# Patient Record
Sex: FEMALE | Race: ASIAN | NOT HISPANIC OR LATINO | URBAN - METROPOLITAN AREA
[De-identification: names, ages, dates, MRNs, and addresses within clinical notes are randomized per-mention and may not be internally consistent; named-entity substitution may affect disease eponyms.]

---

## 2023-09-14 ENCOUNTER — OUTPATIENT (OUTPATIENT)
Dept: OUTPATIENT SERVICES | Facility: HOSPITAL | Age: 33
LOS: 1 days | Discharge: HOME | End: 2023-09-14
Payer: COMMERCIAL

## 2023-09-14 ENCOUNTER — APPOINTMENT (OUTPATIENT)
Dept: ANTEPARTUM | Facility: CLINIC | Age: 33
End: 2023-09-14

## 2023-09-14 ENCOUNTER — EMERGENCY (EMERGENCY)
Facility: HOSPITAL | Age: 33
LOS: 1 days | Discharge: STILL ADMITTED OR ADMINISTRATIVE | End: 2023-09-14
Admitting: EMERGENCY MEDICINE
Payer: SELF-PAY

## 2023-09-14 VITALS
SYSTOLIC BLOOD PRESSURE: 103 MMHG | DIASTOLIC BLOOD PRESSURE: 59 MMHG | RESPIRATION RATE: 16 BRPM | HEART RATE: 85 BPM | OXYGEN SATURATION: 100 %

## 2023-09-14 VITALS
SYSTOLIC BLOOD PRESSURE: 99 MMHG | DIASTOLIC BLOOD PRESSURE: 55 MMHG | TEMPERATURE: 98 F | HEART RATE: 90 BPM | RESPIRATION RATE: 16 BRPM

## 2023-09-14 VITALS — SYSTOLIC BLOOD PRESSURE: 97 MMHG | HEART RATE: 73 BPM | DIASTOLIC BLOOD PRESSURE: 53 MMHG

## 2023-09-14 DIAGNOSIS — O26.899 OTHER SPECIFIED PREGNANCY RELATED CONDITIONS, UNSPECIFIED TRIMESTER: ICD-10-CM

## 2023-09-14 DIAGNOSIS — Z98.891 HISTORY OF UTERINE SCAR FROM PREVIOUS SURGERY: Chronic | ICD-10-CM

## 2023-09-14 LAB
APPEARANCE UR: ABNORMAL
BACTERIA # UR AUTO: NEGATIVE /HPF — SIGNIFICANT CHANGE UP
BILIRUB UR-MCNC: NEGATIVE — SIGNIFICANT CHANGE UP
CAST: 0 /LPF — SIGNIFICANT CHANGE UP (ref 0–4)
COLOR SPEC: YELLOW — SIGNIFICANT CHANGE UP
DIFF PNL FLD: NEGATIVE — SIGNIFICANT CHANGE UP
GLUCOSE UR QL: NEGATIVE MG/DL — SIGNIFICANT CHANGE UP
KETONES UR-MCNC: NEGATIVE MG/DL — SIGNIFICANT CHANGE UP
LEUKOCYTE ESTERASE UR-ACNC: ABNORMAL
NITRITE UR-MCNC: NEGATIVE — SIGNIFICANT CHANGE UP
PH UR: 8 — SIGNIFICANT CHANGE UP (ref 5–8)
PROT UR-MCNC: NEGATIVE MG/DL — SIGNIFICANT CHANGE UP
RBC CASTS # UR COMP ASSIST: 0 /HPF — SIGNIFICANT CHANGE UP (ref 0–4)
SP GR SPEC: 1.01 — SIGNIFICANT CHANGE UP (ref 1–1.03)
SQUAMOUS # UR AUTO: 2 /HPF — SIGNIFICANT CHANGE UP (ref 0–5)
UROBILINOGEN FLD QL: 0.2 MG/DL — SIGNIFICANT CHANGE UP (ref 0.2–1)
WBC UR QL: 1 /HPF — SIGNIFICANT CHANGE UP (ref 0–5)

## 2023-09-14 PROCEDURE — L9996: CPT

## 2023-09-14 PROCEDURE — 99221 1ST HOSP IP/OBS SF/LOW 40: CPT | Mod: 25

## 2023-09-14 PROCEDURE — 59025 FETAL NON-STRESS TEST: CPT | Mod: 26

## 2023-09-14 NOTE — OB RN TRIAGE NOTE - LANGUAGE ASSISTANCE NEEDED
pt speaksmruby andspainsh/Yes-Patient/Caregiver accepts free interpretation services... pt speaks mandarin and Arabic/Yes-Patient/Caregiver accepts free interpretation services...

## 2023-09-14 NOTE — OB PROVIDER TRIAGE NOTE - NS_OBGYNHISTORY_OBGYN_ALL_OB_FT
2011  6lbs 10 oz - in [place]  2014   6lbs 10 oz - in [place]  10/2/2017 primary cs 2/2 oligohydramnios? in [place] - 6lbs 10 oz

## 2023-09-14 NOTE — OB PROVIDER TRIAGE NOTE - NSHPPHYSICALEXAM_GEN_ALL_CORE
Vital Signs Last 24 Hrs  T(C): 36.7 (09-14-23 @ 15:41), Max: 36.7 (09-14-23 @ 15:16)  T(F): 98.06 (09-14-23 @ 15:41), Max: 98.1 (09-14-23 @ 15:16)  HR: 69 (09-14-23 @ 16:46) (69 - 90)  BP: 93/54 (09-14-23 @ 16:46) (91/53 - 107/56)  BP(mean): --  RR: 16 (09-14-23 @ 15:16) (16 - 16)  SpO2: 100% (09-14-23 @ 14:30) (100% - 100%)    abdomen soft, nontender  taus: sliup, iron breech presentation, anterior placenta, bpp 8/8, jaswant 14, m mode  bpm, ultrasound images saved in [facility]  nst reactive  toco: irregular ctx noted

## 2023-09-14 NOTE — ED ADULT TRIAGE NOTE - RELATIONSHIP TO PATIENT
Brother in law Localized Dermabrasion With Wire Brush Text: The patient was draped in routine manner.  Localized dermabrasion using 3 x 17 mm wire brush was performed in routine manner to papillary dermis. This spot dermabrasion is being performed to complete skin cancer reconstruction. It also will eliminate the other sun damaged precancerous cells that are known to be part of the regional effect of a lifetime's worth of sun exposure. This localized dermabrasion is therapeutic and should not be considered cosmetic in any regard. Localized Dermabrasion Text: The patient was draped in routine manner.  Localized dermabrasion using 3 x 17 mm wire brush was performed in routine manner to papillary dermis. This spot dermabrasion is being performed to complete skin cancer reconstruction. It also will eliminate the other sun damaged precancerous cells that are known to be part of the regional effect of a lifetime's worth of sun exposure. This localized dermabrasion is therapeutic and should not be considered cosmetic in any regard.

## 2023-09-14 NOTE — OB PROVIDER TRIAGE NOTE - HISTORY OF PRESENT ILLNESS
H+P obtained via  # [id number]    32 y.o.  RODOLFO 23 @ 30.2 weeks (pt provided 12.5 week ultrasound report) presents with c/o decreased FM since 23. Pt states + movement, "just decreased." Pt denies LOF, VB, ctx, fever, chills. Pt states she is newly arrived from [place], will remain in [place] with her sister until delivery and desires to establish prenatal care with [facility]. Pt states she will call her insurance company to choose an OB provider and establish prenatal care in [place]. Pt provided prenatal records for review.

## 2023-09-14 NOTE — OB PROVIDER TRIAGE NOTE - NS_DISCHARGEPRINT_OBGYN_ALL_OB
"Labs reviewed and chemotherapy signed from [place] via [facility] Every[facility].  Do not see results for a urine protein so will leave orders for this to be collected in clinic tomorrow prior to her Avastin infusion.  Per Marta Lao's note \" Ok to proceed with planned treatment with ANC 1.2 per Dr. Arnold. No Neulasta, dose reduction of Carbo and Taxol completed in treatment plan per MD\".  Carboplatin reduced to AUC 5 and paclitaxel 150 mg/2.    JERICHO Don CNP      Ref Range & Units 1 d ago Comments   WBC 4.0 - 11.0 K/uL 3.0 Low      RBC 3.80 - 5.30 M/uL 3.36 Low      Hemoglobin 11.5 - 15.8 g/dL 11.9     Hematocrit 35.0 - 45.0 % 37.3     MCV 80.0 - 98.0 fL 111.0 High   Macrocytosis present.   MCH 25.5 - 34.0 pg 35.4 High      MCHC 31.5 - 36.5 g/dL 31.9     RDW-CV 11.5 - 15.5 % 14.2     RDW-SD 35.5 - 50.0 fl 55.3 High      Platelet Count 140 - 400 K/uL 97 Low      MPV 8.5 - 12.0 fL 10.0     Seg Neut Absolute 1.8 - 8.0 K/uL 1.2 Low      Lymphocytes Absolute 0.8 - 4.1 K/uL 1.6     Monocytes Absolute 0.0 - 1.0 K/uL 0.3     Eosinophils Absolute 0.0 - 0.7 K/uL 0.0     Basophil Absolute 0.0 - 0.2 K/uL 0.0     Immature Granulocyte Absolute 0.00 - 0.06 K/uL 0.01     Neutrophils Abs. (Segs and Bands) /uL 1,200     Neutrophils Percent % 37.9     Lymphocytes Percent % 52.5     Monocytes Percent % 8.3     Immature Granulocyte Percent % 0.3     Eosinophils Percent % 0.3     Basophil Percent % 0.7     Resulting Agency  [facility]    Specimen Collected: 03/22/23  8:49 AM Last Resulted: 03/22/23  9:21 AM   [facility]  Result Received: 03/23/23  1:47 PM        Ref Range & Units Today Resulting Agency   Glucose 70 - 99 mg/dL 94  [facility]   BUN 6 - 22 mg/dL 20  [facility]   Creatinine 0.55 - 1.02 mg/dL 0.83  [facility]   BUN/Creatinine Ratio 10.0 - 25.0 24.1  [facility] " [facility]   Sodium 136 - 145 meq/L 136  [facility]   Potassium 3.5 - 5.1 meq/L 4.5  [facility]   Chloride 98 - 109 meq/L 103  [facility]   CO2 22 - 31 meq/L 25  [facility]   Anion Gap with K 6 - 20 meq/L 13  [facility]   Calcium 8.5 - 10.5 mg/dL 9.9  [facility]   Protein Total 6.0 - 8.3 g/dL 7.5  [facility]   Albumin 3.2 - 4.6 g/dL 4.0  [facility]   Alkaline Phosphatase 40 - 150 U/L 108  [facility]   AST - SGOT 5 - 45 U/L 29  [facility]   ALT - SGPT 0 - 55 U/L 27  [facility]   Bilirubin Total 0.2 - 1.2 mg/dL 0.3  [facility]   Age Years 65  [facility]   eGFRcr(AS) >=60 mL/min/1.73m2 78  [facility]   Fasting Yes, No, Unknown No  [facility]   Specimen Collected: 03/23/23 11:59 AM Last Resulted: 03/23/23 12:35 PM   Received From: [facility]  Result Received: 03/23/23  2:52 PM       Magnesium 1.6 - 2.6 mg/dL 1.9    Resulting Agency  [facility]   Specimen Collected: 03/22/23  8:49 AM Last Resulted: 03/22/23  9:29 AM   Received From: [facility]  Result Received: 03/23/23  2:52 PM        Ref Range & Units 1 d ago    0 - 35 U/mL 558 High     Resulting Agency  [facility]   Narrative  Performed by [facility]   was measured using the Abbott method.  Results measured using different testing methods cannot be directly compared.  Specimen Collected: 03/22/23  8:49 AM Last Resulted: 03/22/23  4:10 PM           OB Discharge Instructions

## 2023-09-14 NOTE — OB RN TRIAGE NOTE - FALL HARM RISK - UNIVERSAL INTERVENTIONS
Bed in lowest position, wheels locked, appropriate side rails in place/Call bell, personal items and telephone in reach/Instruct patient to call for assistance before getting out of bed or chair/Non-slip footwear when patient is out of bed/[place] to call system/Physically safe environment - no spills, clutter or unnecessary equipment/Purposeful Proactive Rounding/Room/bathroom lighting operational, light cord in reach

## 2023-09-14 NOTE — ED ADULT NURSE NOTE - CHIEF COMPLAINT QUOTE
Pt 32 weeks pregnant c/o decreased fetal movement from last night. Pt recently traveling from [place]. Does not have US OBGYN care. RODOLFO 11/15

## 2023-09-14 NOTE — OB PROVIDER TRIAGE NOTE - NSOBPROVIDERNOTE_OBGYN_ALL_OB_FT
a/p: 32 y.o.  RODOLFO 23 @ 30.2 weeks hx decreased fetal movement    BPP 10/10 jaswant 14  pt reports +FM and is reassured by fetal movements visualized on ultrasound screen    1625  contractions noted on nst  ua, ucx pending    tvus: a/p: 32 y.o.  RODOLFO 23 @ 30.2 weeks hx decreased fetal movement    BPP 10/10 jaswant 14  pt reports +FM and is reassured by fetal movements visualized on ultrasound screen    1625  contractions noted on nst  ua, ucx pending    plan of care reviewed with patient via  # [id number],  expressing difficulty translating in Macedonian, discussed with patient use of Mandarin  instead, pt agrees:  Mandarin  # [id number]  tvus: cervical length 3.5, no dynamic changes/funneling noted  ua negative  ucx pending      Discussed with Dr Drummond. Plan for discharge home.   labor precautions/fetal kick counts reviewed via Mandarin  #   Pt given copy of Prenatal Care Programs Accepting Medicaid Reimbursement ; pt states she will call her insurance company to determine OB provider coverage at [facility] and will choose provider accordingly and establish prenatal care a/p: 32 y.o.  RODOLFO 23 @ 30.2 weeks hx decreased fetal movement    BPP 10/10 jaswant 14  pt reports +FM and is reassured by fetal movements visualized on ultrasound screen    1625  contractions noted on nst  ua, ucx pending    plan of care reviewed with patient via  # [id number],  expressing difficulty translating in Setswana, discussed with patient use of Mandarin  instead, pt agrees:  Mandarin  # [id number]  tvus: cervical length 3.5, no dynamic changes/funneling noted  ua negative  ucx pending    no evidence of PTL   pt reports +FM  triage team to follow up with ucx result    [id number]  Discussed with Dr Drummond. Plan for discharge home.   labor precautions/fetal kick counts reviewed via Mandarin  # [id number]  Pt given copy of Prenatal Care Programs Accepting Medicaid Reimbursement ; pt states she will call her insurance company to determine OB provider coverage at [facility] and will choose provider accordingly and establish prenatal care as soon as possible    pt discharged at 1808    Lucio, NP

## 2023-09-14 NOTE — OB PROVIDER TRIAGE NOTE - NSHPLABSRESULTS_GEN_ALL_CORE
Urinalysis Basic - ( 14 Sep 2023 16:36 )    Color: Yellow / Appearance: Cloudy / S.015 / pH: x  Gluc: x / Ketone: Negative mg/dL  / Bili: Negative / Urobili: 0.2 mg/dL   Blood: x / Protein: Negative mg/dL / Nitrite: Negative   Leuk Esterase: Trace / RBC: 0 /HPF / WBC 1 /HPF   Sq Epi: x / Non Sq Epi: 2 /HPF / Bacteria: Negative /HPF

## 2023-09-14 NOTE — ED ADULT TRIAGE NOTE - CHIEF COMPLAINT QUOTE
Pt 32 weeks pregnant c/o decreased fetal movement from last night. Pt recently traveling from [place]. Does not have US OBGYN care. RODOLFO 11/15

## 2023-09-15 DIAGNOSIS — Z3A.30 30 WEEKS GESTATION OF PREGNANCY: ICD-10-CM

## 2023-09-15 DIAGNOSIS — O34.219 MATERNAL CARE FOR UNSPECIFIED TYPE SCAR FROM PREVIOUS CESAREAN DELIVERY: ICD-10-CM

## 2023-09-15 DIAGNOSIS — O36.8130 DECREASED FETAL MOVEMENTS, THIRD TRIMESTER, NOT APPLICABLE OR UNSPECIFIED: ICD-10-CM

## 2023-09-15 LAB
CULTURE RESULTS: SIGNIFICANT CHANGE UP
SPECIMEN SOURCE: SIGNIFICANT CHANGE UP

## 2023-09-21 ENCOUNTER — APPOINTMENT (OUTPATIENT)
Dept: OBGYN | Facility: CLINIC | Age: 33
End: 2023-09-21
Payer: MEDICAID

## 2023-09-21 VITALS
SYSTOLIC BLOOD PRESSURE: 125 MMHG | HEART RATE: 91 BPM | HEIGHT: 63 IN | BODY MASS INDEX: 27.29 KG/M2 | WEIGHT: 154 LBS | DIASTOLIC BLOOD PRESSURE: 78 MMHG

## 2023-09-21 DIAGNOSIS — N76.0 ACUTE VAGINITIS: ICD-10-CM

## 2023-09-21 PROBLEM — Z00.00 ENCOUNTER FOR PREVENTIVE HEALTH EXAMINATION: Status: ACTIVE | Noted: 2023-09-21

## 2023-09-21 PROCEDURE — 99205 OFFICE O/P NEW HI 60 MIN: CPT

## 2023-09-22 LAB
BASOPHILS # BLD AUTO: 0.03 K/UL
BASOPHILS NFR BLD AUTO: 0.4 %
EOSINOPHIL # BLD AUTO: 0.04 K/UL
EOSINOPHIL NFR BLD AUTO: 0.6 %
HCT VFR BLD CALC: 36.8 %
HGB BLD-MCNC: 12.1 G/DL
IMM GRANULOCYTES NFR BLD AUTO: 0.4 %
LYMPHOCYTES # BLD AUTO: 1.51 K/UL
LYMPHOCYTES NFR BLD AUTO: 21.8 %
MAN DIFF?: NORMAL
MCHC RBC-ENTMCNC: 31.9 PG
MCHC RBC-ENTMCNC: 32.9 GM/DL
MCV RBC AUTO: 97.1 FL
MONOCYTES # BLD AUTO: 0.69 K/UL
MONOCYTES NFR BLD AUTO: 10 %
NEUTROPHILS # BLD AUTO: 4.63 K/UL
NEUTROPHILS NFR BLD AUTO: 66.8 %
PLATELET # BLD AUTO: 202 K/UL
RBC # BLD: 3.79 M/UL
RBC # FLD: 12.8 %
WBC # FLD AUTO: 6.93 K/UL

## 2023-10-05 ENCOUNTER — APPOINTMENT (OUTPATIENT)
Dept: OBGYN | Facility: CLINIC | Age: 33
End: 2023-10-05
Payer: COMMERCIAL

## 2023-10-05 ENCOUNTER — NON-APPOINTMENT (OUTPATIENT)
Age: 33
End: 2023-10-05

## 2023-10-05 VITALS
DIASTOLIC BLOOD PRESSURE: 64 MMHG | WEIGHT: 155 LBS | BODY MASS INDEX: 27.46 KG/M2 | HEART RATE: 106 BPM | SYSTOLIC BLOOD PRESSURE: 103 MMHG | HEIGHT: 63 IN

## 2023-10-05 DIAGNOSIS — N89.8 OTHER SPECIFIED NONINFLAMMATORY DISORDERS OF VAGINA: ICD-10-CM

## 2023-10-05 LAB
CANDIDA VAG CYTO: DETECTED
G VAGINALIS+PREV SP MTYP VAG QL MICRO: DETECTED
GLUCOSE 1H P 100 G GLC PO SERPL-MCNC: 151 MG/DL
T PALLIDUM AB SER QL IA: NEGATIVE
T VAGINALIS VAG QL WET PREP: NOT DETECTED

## 2023-10-05 PROCEDURE — 0502F SUBSEQUENT PRENATAL CARE: CPT

## 2023-10-05 RX ORDER — METRONIDAZOLE 7.5 MG/G
0.75 GEL VAGINAL
Qty: 1 | Refills: 1 | Status: ACTIVE | COMMUNITY
Start: 2023-10-05 | End: 1900-01-01

## 2023-10-09 ENCOUNTER — APPOINTMENT (OUTPATIENT)
Dept: ANTEPARTUM | Facility: CLINIC | Age: 33
End: 2023-10-09
Payer: COMMERCIAL

## 2023-10-09 ENCOUNTER — ASOB RESULT (OUTPATIENT)
Age: 33
End: 2023-10-09

## 2023-10-09 PROCEDURE — 99212 OFFICE O/P EST SF 10 MIN: CPT | Mod: 25

## 2023-10-09 PROCEDURE — 76805 OB US >/= 14 WKS SNGL FETUS: CPT

## 2023-10-09 PROCEDURE — 76819 FETAL BIOPHYS PROFIL W/O NST: CPT

## 2023-10-16 ENCOUNTER — NON-APPOINTMENT (OUTPATIENT)
Age: 33
End: 2023-10-16

## 2023-10-16 ENCOUNTER — APPOINTMENT (OUTPATIENT)
Dept: OBGYN | Facility: CLINIC | Age: 33
End: 2023-10-16
Payer: COMMERCIAL

## 2023-10-16 VITALS
HEART RATE: 90 BPM | WEIGHT: 157 LBS | SYSTOLIC BLOOD PRESSURE: 96 MMHG | HEIGHT: 63 IN | BODY MASS INDEX: 27.82 KG/M2 | DIASTOLIC BLOOD PRESSURE: 65 MMHG

## 2023-10-16 PROCEDURE — 0502F SUBSEQUENT PRENATAL CARE: CPT

## 2023-10-16 RX ORDER — TERCONAZOLE 8 MG/G
0.8 CREAM VAGINAL
Qty: 1 | Refills: 0 | Status: ACTIVE | COMMUNITY
Start: 2023-10-16 | End: 1900-01-01

## 2023-10-25 ENCOUNTER — APPOINTMENT (OUTPATIENT)
Dept: OBGYN | Facility: CLINIC | Age: 33
End: 2023-10-25
Payer: COMMERCIAL

## 2023-10-25 VITALS
WEIGHT: 157 LBS | DIASTOLIC BLOOD PRESSURE: 71 MMHG | HEIGHT: 63 IN | BODY MASS INDEX: 27.82 KG/M2 | SYSTOLIC BLOOD PRESSURE: 112 MMHG | HEART RATE: 101 BPM

## 2023-10-25 PROCEDURE — 0502F SUBSEQUENT PRENATAL CARE: CPT

## 2023-10-26 LAB — HIV1+2 AB SPEC QL IA.RAPID: NONREACTIVE

## 2023-10-27 LAB
GP B STREP DNA SPEC QL NAA+PROBE: DETECTED
SOURCE GBS: NORMAL

## 2023-10-30 ENCOUNTER — APPOINTMENT (OUTPATIENT)
Dept: ANTEPARTUM | Facility: CLINIC | Age: 33
End: 2023-10-30

## 2023-11-01 ENCOUNTER — APPOINTMENT (OUTPATIENT)
Dept: OBGYN | Facility: CLINIC | Age: 33
End: 2023-11-01
Payer: COMMERCIAL

## 2023-11-01 VITALS
SYSTOLIC BLOOD PRESSURE: 119 MMHG | WEIGHT: 160 LBS | HEART RATE: 109 BPM | HEIGHT: 63 IN | BODY MASS INDEX: 28.35 KG/M2 | DIASTOLIC BLOOD PRESSURE: 66 MMHG

## 2023-11-01 PROCEDURE — 0502F SUBSEQUENT PRENATAL CARE: CPT

## 2023-11-01 RX ORDER — TERCONAZOLE 80 MG/1
80 SUPPOSITORY VAGINAL
Qty: 1 | Refills: 3 | Status: ACTIVE | COMMUNITY
Start: 2023-10-16 | End: 1900-01-01

## 2023-11-06 ENCOUNTER — APPOINTMENT (OUTPATIENT)
Dept: OBGYN | Facility: CLINIC | Age: 33
End: 2023-11-06
Payer: COMMERCIAL

## 2023-11-06 VITALS
DIASTOLIC BLOOD PRESSURE: 69 MMHG | BODY MASS INDEX: 28.7 KG/M2 | HEIGHT: 63 IN | HEART RATE: 91 BPM | SYSTOLIC BLOOD PRESSURE: 105 MMHG | WEIGHT: 162 LBS

## 2023-11-06 PROCEDURE — 0502F SUBSEQUENT PRENATAL CARE: CPT

## 2023-11-06 RX ORDER — CLOTRIMAZOLE 10 MG/G
1 CREAM VAGINAL
Qty: 1 | Refills: 1 | Status: ACTIVE | COMMUNITY
Start: 2023-10-05 | End: 1900-01-01

## 2023-11-09 ENCOUNTER — OUTPATIENT (OUTPATIENT)
Dept: OUTPATIENT SERVICES | Facility: HOSPITAL | Age: 33
LOS: 1 days | End: 2023-11-09

## 2023-11-09 VITALS
TEMPERATURE: 98 F | HEIGHT: 65 IN | RESPIRATION RATE: 14 BRPM | SYSTOLIC BLOOD PRESSURE: 114 MMHG | WEIGHT: 162.92 LBS | OXYGEN SATURATION: 99 % | HEART RATE: 93 BPM | DIASTOLIC BLOOD PRESSURE: 72 MMHG

## 2023-11-09 DIAGNOSIS — Z98.891 HISTORY OF UTERINE SCAR FROM PREVIOUS SURGERY: ICD-10-CM

## 2023-11-09 DIAGNOSIS — Z98.891 HISTORY OF UTERINE SCAR FROM PREVIOUS SURGERY: Chronic | ICD-10-CM

## 2023-11-09 LAB
APPEARANCE UR: CLEAR — SIGNIFICANT CHANGE UP
APPEARANCE UR: CLEAR — SIGNIFICANT CHANGE UP
BACTERIA # UR AUTO: NEGATIVE /HPF — SIGNIFICANT CHANGE UP
BACTERIA # UR AUTO: NEGATIVE /HPF — SIGNIFICANT CHANGE UP
BILIRUB UR-MCNC: NEGATIVE — SIGNIFICANT CHANGE UP
BILIRUB UR-MCNC: NEGATIVE — SIGNIFICANT CHANGE UP
BLD GP AB SCN SERPL QL: NEGATIVE — SIGNIFICANT CHANGE UP
BLD GP AB SCN SERPL QL: NEGATIVE — SIGNIFICANT CHANGE UP
CAST: 0 /LPF — SIGNIFICANT CHANGE UP (ref 0–4)
CAST: 0 /LPF — SIGNIFICANT CHANGE UP (ref 0–4)
COLOR SPEC: YELLOW — SIGNIFICANT CHANGE UP
COLOR SPEC: YELLOW — SIGNIFICANT CHANGE UP
DIFF PNL FLD: NEGATIVE — SIGNIFICANT CHANGE UP
DIFF PNL FLD: NEGATIVE — SIGNIFICANT CHANGE UP
GLUCOSE UR QL: 100 MG/DL
GLUCOSE UR QL: 100 MG/DL
HCT VFR BLD CALC: 35.4 % — SIGNIFICANT CHANGE UP (ref 34.5–45)
HCT VFR BLD CALC: 35.4 % — SIGNIFICANT CHANGE UP (ref 34.5–45)
HGB BLD-MCNC: 11.6 G/DL — SIGNIFICANT CHANGE UP (ref 11.5–15.5)
HGB BLD-MCNC: 11.6 G/DL — SIGNIFICANT CHANGE UP (ref 11.5–15.5)
KETONES UR-MCNC: NEGATIVE MG/DL — SIGNIFICANT CHANGE UP
KETONES UR-MCNC: NEGATIVE MG/DL — SIGNIFICANT CHANGE UP
LEUKOCYTE ESTERASE UR-ACNC: ABNORMAL
LEUKOCYTE ESTERASE UR-ACNC: ABNORMAL
MCHC RBC-ENTMCNC: 30.6 PG — SIGNIFICANT CHANGE UP (ref 27–34)
MCHC RBC-ENTMCNC: 30.6 PG — SIGNIFICANT CHANGE UP (ref 27–34)
MCHC RBC-ENTMCNC: 32.8 GM/DL — SIGNIFICANT CHANGE UP (ref 32–36)
MCHC RBC-ENTMCNC: 32.8 GM/DL — SIGNIFICANT CHANGE UP (ref 32–36)
MCV RBC AUTO: 93.4 FL — SIGNIFICANT CHANGE UP (ref 80–100)
MCV RBC AUTO: 93.4 FL — SIGNIFICANT CHANGE UP (ref 80–100)
NITRITE UR-MCNC: NEGATIVE — SIGNIFICANT CHANGE UP
NITRITE UR-MCNC: NEGATIVE — SIGNIFICANT CHANGE UP
NRBC # BLD: 0 /100 WBCS — SIGNIFICANT CHANGE UP (ref 0–0)
NRBC # BLD: 0 /100 WBCS — SIGNIFICANT CHANGE UP (ref 0–0)
NRBC # FLD: 0 K/UL — SIGNIFICANT CHANGE UP (ref 0–0)
NRBC # FLD: 0 K/UL — SIGNIFICANT CHANGE UP (ref 0–0)
PH UR: 6.5 — SIGNIFICANT CHANGE UP (ref 5–8)
PH UR: 6.5 — SIGNIFICANT CHANGE UP (ref 5–8)
PLATELET # BLD AUTO: 216 K/UL — SIGNIFICANT CHANGE UP (ref 150–400)
PLATELET # BLD AUTO: 216 K/UL — SIGNIFICANT CHANGE UP (ref 150–400)
PROT UR-MCNC: NEGATIVE MG/DL — SIGNIFICANT CHANGE UP
PROT UR-MCNC: NEGATIVE MG/DL — SIGNIFICANT CHANGE UP
RBC # BLD: 3.79 M/UL — LOW (ref 3.8–5.2)
RBC # BLD: 3.79 M/UL — LOW (ref 3.8–5.2)
RBC # FLD: 13.3 % — SIGNIFICANT CHANGE UP (ref 10.3–14.5)
RBC # FLD: 13.3 % — SIGNIFICANT CHANGE UP (ref 10.3–14.5)
RBC CASTS # UR COMP ASSIST: 0 /HPF — SIGNIFICANT CHANGE UP (ref 0–4)
RBC CASTS # UR COMP ASSIST: 0 /HPF — SIGNIFICANT CHANGE UP (ref 0–4)
REVIEW: SIGNIFICANT CHANGE UP
REVIEW: SIGNIFICANT CHANGE UP
RH IG SCN BLD-IMP: POSITIVE — SIGNIFICANT CHANGE UP
RH IG SCN BLD-IMP: POSITIVE — SIGNIFICANT CHANGE UP
SP GR SPEC: 1.01 — SIGNIFICANT CHANGE UP (ref 1–1.03)
SP GR SPEC: 1.01 — SIGNIFICANT CHANGE UP (ref 1–1.03)
SQUAMOUS # UR AUTO: 4 /HPF — SIGNIFICANT CHANGE UP (ref 0–5)
SQUAMOUS # UR AUTO: 4 /HPF — SIGNIFICANT CHANGE UP (ref 0–5)
UROBILINOGEN FLD QL: 0.2 MG/DL — SIGNIFICANT CHANGE UP (ref 0.2–1)
UROBILINOGEN FLD QL: 0.2 MG/DL — SIGNIFICANT CHANGE UP (ref 0.2–1)
WBC # BLD: 6.81 K/UL — SIGNIFICANT CHANGE UP (ref 3.8–10.5)
WBC # BLD: 6.81 K/UL — SIGNIFICANT CHANGE UP (ref 3.8–10.5)
WBC # FLD AUTO: 6.81 K/UL — SIGNIFICANT CHANGE UP (ref 3.8–10.5)
WBC # FLD AUTO: 6.81 K/UL — SIGNIFICANT CHANGE UP (ref 3.8–10.5)
WBC UR QL: 2 /HPF — SIGNIFICANT CHANGE UP (ref 0–5)
WBC UR QL: 2 /HPF — SIGNIFICANT CHANGE UP (ref 0–5)

## 2023-11-09 NOTE — OB PST NOTE - NSHPPHYSICALEXAM_GEN_ALL_CORE

## 2023-11-09 NOTE — OB PST NOTE - PROBLEM SELECTOR PLAN 1
Pt scheduled for c- section, bilateral salpingectomy on 11/14/2023.  labs done results pending, chlorhexidine provided-  written and verbal instructions given, with teach back pt able to verbalize understanding.   medication day of procedure-  pepcid

## 2023-11-09 NOTE — OB PST NOTE - HISTORY OF PRESENT ILLNESS
32y/o female scheduled for repeat  section, bilateral salpingectomy on 2023.  As per scheduled for  having repeat c- section, reports good fetal movement.

## 2023-11-09 NOTE — OB PST NOTE - NSHPREVIEWOFSYSTEMS_GEN_ALL_CORE
General: No fever, chills, sweating, anorexia, weight loss. No polyphagia, polyurea, polydypsia, malaise, or fatigue    Skin: No rashes, itching, or dryness. No change in size/color of moles. No tumors, brittle nails, pitted nails, or hair loss    Breast: No tenderness, lumps, or nipple discharge      Ophthalmologic: No diplopia, photophobia, lacrimation, blurred Vision , or eye discharge    ENMT Symptoms: No hearing difficulty, ear pain, tinnitus, or vertigo. No sinus symptoms, nasal congestion, nasal   discharge, or nasal obstruction    Respiratory and Thorax: No wheezing, dyspnea, cough, hemoptysis, or pleuritic chest pPain     Cardiovascular: No chest pain, palpitations, dyspnea on exertion, orthopnea, paroxysmal nocturnal dyspnea,   peripheral edema, or claudication    Gastrointestinal: No nausea, vomiting, diarrhea, constipation, change in bowel habits, flatulence, abdominal pain, or melena    Genitourinary/ Pelvis:  reports good fetal movement  No hematuria, renal colic, or flank pain.  No urine discoloration, incontinence, dysuria, or urinary hesitancy. Normal urinary frequency. No nocturia, abnormal vaginal bleeding, vaginal discharge, spotting, pelvic pain, or vaginal leakage    Musculoskeletal: No arthralgia, arthritis, joint swelling, muscle cramping, muscle weakness, neck pain, arm pain, or leg pain    Neurological: No transient paralysis, weakness, paresthesias, or seizures. No syncope, tremors, vertigo, loss of sensation, difficulty walking, loss of consciousness, hemiparesis, confusion, or facial palsy    Psychiatric: No suicidal ideation, depression, anxiety, insomnia, memory loss, paranoia, mood swings, agitation, hallucinations, or hyperactivity    Hematology: No gum bleeding, nose bleeding, or skin lumps    Lymphatic: No enlarged or tender lymph nodes. No extremity swelling    Endocrine: No heat or cold intolerance    Immunologic: No recurrent or persistent infections

## 2023-11-13 ENCOUNTER — TRANSCRIPTION ENCOUNTER (OUTPATIENT)
Age: 33
End: 2023-11-13

## 2023-11-13 ENCOUNTER — APPOINTMENT (OUTPATIENT)
Dept: OBGYN | Facility: CLINIC | Age: 33
End: 2023-11-13
Payer: COMMERCIAL

## 2023-11-13 VITALS
HEART RATE: 94 BPM | DIASTOLIC BLOOD PRESSURE: 72 MMHG | BODY MASS INDEX: 29.06 KG/M2 | SYSTOLIC BLOOD PRESSURE: 112 MMHG | HEIGHT: 63 IN | WEIGHT: 164 LBS

## 2023-11-13 DIAGNOSIS — Z34.93 ENCOUNTER FOR SUPERVISION OF NORMAL PREGNANCY, UNSPECIFIED, THIRD TRIMESTER: ICD-10-CM

## 2023-11-13 PROCEDURE — 0502F SUBSEQUENT PRENATAL CARE: CPT

## 2023-11-14 ENCOUNTER — INPATIENT (INPATIENT)
Facility: HOSPITAL | Age: 33
LOS: 8 days | Discharge: HOME | End: 2023-11-23
Attending: OBSTETRICS & GYNECOLOGY | Admitting: OBSTETRICS & GYNECOLOGY
Payer: COMMERCIAL

## 2023-11-14 ENCOUNTER — RESULT REVIEW (OUTPATIENT)
Age: 33
End: 2023-11-14

## 2023-11-14 ENCOUNTER — TRANSCRIPTION ENCOUNTER (OUTPATIENT)
Age: 33
End: 2023-11-14

## 2023-11-14 ENCOUNTER — APPOINTMENT (OUTPATIENT)
Dept: OBGYN | Facility: HOSPITAL | Age: 33
End: 2023-11-14

## 2023-11-14 VITALS — TEMPERATURE: 98 F

## 2023-11-14 DIAGNOSIS — Z98.891 HISTORY OF UTERINE SCAR FROM PREVIOUS SURGERY: ICD-10-CM

## 2023-11-14 DIAGNOSIS — Z98.891 HISTORY OF UTERINE SCAR FROM PREVIOUS SURGERY: Chronic | ICD-10-CM

## 2023-11-14 LAB
ANION GAP SERPL CALC-SCNC: 10 MMOL/L — SIGNIFICANT CHANGE UP (ref 7–14)
ANION GAP SERPL CALC-SCNC: 10 MMOL/L — SIGNIFICANT CHANGE UP (ref 7–14)
ANION GAP SERPL CALC-SCNC: 11 MMOL/L — SIGNIFICANT CHANGE UP (ref 7–14)
ANION GAP SERPL CALC-SCNC: 11 MMOL/L — SIGNIFICANT CHANGE UP (ref 7–14)
APTT BLD: 24.3 SEC — LOW (ref 24.5–35.6)
APTT BLD: 24.3 SEC — LOW (ref 24.5–35.6)
APTT BLD: 27.3 SEC — SIGNIFICANT CHANGE UP (ref 24.5–35.6)
APTT BLD: 27.3 SEC — SIGNIFICANT CHANGE UP (ref 24.5–35.6)
APTT BLD: 27.5 SEC — SIGNIFICANT CHANGE UP (ref 24.5–35.6)
APTT BLD: 27.5 SEC — SIGNIFICANT CHANGE UP (ref 24.5–35.6)
BASOPHILS # BLD AUTO: 0.02 K/UL — SIGNIFICANT CHANGE UP (ref 0–0.2)
BASOPHILS # BLD AUTO: 0.02 K/UL — SIGNIFICANT CHANGE UP (ref 0–0.2)
BASOPHILS # BLD AUTO: 0.03 K/UL — SIGNIFICANT CHANGE UP (ref 0–0.2)
BASOPHILS # BLD AUTO: 0.03 K/UL — SIGNIFICANT CHANGE UP (ref 0–0.2)
BASOPHILS NFR BLD AUTO: 0.1 % — SIGNIFICANT CHANGE UP (ref 0–2)
BASOPHILS NFR BLD AUTO: 0.1 % — SIGNIFICANT CHANGE UP (ref 0–2)
BASOPHILS NFR BLD AUTO: 0.4 % — SIGNIFICANT CHANGE UP (ref 0–2)
BASOPHILS NFR BLD AUTO: 0.4 % — SIGNIFICANT CHANGE UP (ref 0–2)
BLD GP AB SCN SERPL QL: NEGATIVE — SIGNIFICANT CHANGE UP
BLD GP AB SCN SERPL QL: NEGATIVE — SIGNIFICANT CHANGE UP
BLOOD GAS ARTERIAL - LYTES,HGB,ICA,LACT RESULT: SIGNIFICANT CHANGE UP
BLOOD GAS ARTERIAL - LYTES,HGB,ICA,LACT RESULT: SIGNIFICANT CHANGE UP
BLOOD GAS ARTERIAL COMPREHENSIVE RESULT: SIGNIFICANT CHANGE UP
BLOOD GAS ARTERIAL COMPREHENSIVE RESULT: SIGNIFICANT CHANGE UP
BUN SERPL-MCNC: 6 MG/DL — LOW (ref 7–23)
CA-I BLD-SCNC: 1.3 MMOL/L — HIGH (ref 1.15–1.29)
CA-I BLD-SCNC: 1.3 MMOL/L — HIGH (ref 1.15–1.29)
CALCIUM SERPL-MCNC: 8.8 MG/DL — SIGNIFICANT CHANGE UP (ref 8.4–10.5)
CALCIUM SERPL-MCNC: 8.8 MG/DL — SIGNIFICANT CHANGE UP (ref 8.4–10.5)
CALCIUM SERPL-MCNC: 9.2 MG/DL — SIGNIFICANT CHANGE UP (ref 8.4–10.5)
CALCIUM SERPL-MCNC: 9.2 MG/DL — SIGNIFICANT CHANGE UP (ref 8.4–10.5)
CHLORIDE SERPL-SCNC: 106 MMOL/L — SIGNIFICANT CHANGE UP (ref 98–107)
CHLORIDE SERPL-SCNC: 106 MMOL/L — SIGNIFICANT CHANGE UP (ref 98–107)
CHLORIDE SERPL-SCNC: 107 MMOL/L — SIGNIFICANT CHANGE UP (ref 98–107)
CHLORIDE SERPL-SCNC: 107 MMOL/L — SIGNIFICANT CHANGE UP (ref 98–107)
CO2 SERPL-SCNC: 20 MMOL/L — LOW (ref 22–31)
CO2 SERPL-SCNC: 20 MMOL/L — LOW (ref 22–31)
CO2 SERPL-SCNC: 22 MMOL/L — SIGNIFICANT CHANGE UP (ref 22–31)
CO2 SERPL-SCNC: 22 MMOL/L — SIGNIFICANT CHANGE UP (ref 22–31)
CREAT SERPL-MCNC: 0.31 MG/DL — LOW (ref 0.5–1.3)
CREAT SERPL-MCNC: 0.31 MG/DL — LOW (ref 0.5–1.3)
CREAT SERPL-MCNC: 0.34 MG/DL — LOW (ref 0.5–1.3)
CREAT SERPL-MCNC: 0.34 MG/DL — LOW (ref 0.5–1.3)
EGFR: 138 ML/MIN/1.73M2 — SIGNIFICANT CHANGE UP
EGFR: 138 ML/MIN/1.73M2 — SIGNIFICANT CHANGE UP
EGFR: 142 ML/MIN/1.73M2 — SIGNIFICANT CHANGE UP
EGFR: 142 ML/MIN/1.73M2 — SIGNIFICANT CHANGE UP
EOSINOPHIL # BLD AUTO: 0 K/UL — SIGNIFICANT CHANGE UP (ref 0–0.5)
EOSINOPHIL # BLD AUTO: 0 K/UL — SIGNIFICANT CHANGE UP (ref 0–0.5)
EOSINOPHIL # BLD AUTO: 0.06 K/UL — SIGNIFICANT CHANGE UP (ref 0–0.5)
EOSINOPHIL # BLD AUTO: 0.06 K/UL — SIGNIFICANT CHANGE UP (ref 0–0.5)
EOSINOPHIL NFR BLD AUTO: 0 % — SIGNIFICANT CHANGE UP (ref 0–6)
EOSINOPHIL NFR BLD AUTO: 0 % — SIGNIFICANT CHANGE UP (ref 0–6)
EOSINOPHIL NFR BLD AUTO: 0.8 % — SIGNIFICANT CHANGE UP (ref 0–6)
EOSINOPHIL NFR BLD AUTO: 0.8 % — SIGNIFICANT CHANGE UP (ref 0–6)
FIBRINOGEN PPP-MCNC: 198 MG/DL — LOW (ref 200–465)
FIBRINOGEN PPP-MCNC: 198 MG/DL — LOW (ref 200–465)
FIBRINOGEN PPP-MCNC: 250 MG/DL — SIGNIFICANT CHANGE UP (ref 200–465)
FIBRINOGEN PPP-MCNC: 250 MG/DL — SIGNIFICANT CHANGE UP (ref 200–465)
GAS PNL BLDA: SIGNIFICANT CHANGE UP
GLUCOSE SERPL-MCNC: 116 MG/DL — HIGH (ref 70–99)
GLUCOSE SERPL-MCNC: 116 MG/DL — HIGH (ref 70–99)
GLUCOSE SERPL-MCNC: 123 MG/DL — HIGH (ref 70–99)
GLUCOSE SERPL-MCNC: 123 MG/DL — HIGH (ref 70–99)
HCT VFR BLD CALC: 22.4 % — LOW (ref 34.5–45)
HCT VFR BLD CALC: 22.4 % — LOW (ref 34.5–45)
HCT VFR BLD CALC: 25.5 % — LOW (ref 34.5–45)
HCT VFR BLD CALC: 25.5 % — LOW (ref 34.5–45)
HCT VFR BLD CALC: 27.5 % — LOW (ref 34.5–45)
HCT VFR BLD CALC: 27.5 % — LOW (ref 34.5–45)
HCT VFR BLD CALC: 27.6 % — LOW (ref 34.5–45)
HCT VFR BLD CALC: 27.6 % — LOW (ref 34.5–45)
HCT VFR BLD CALC: 35.8 % — SIGNIFICANT CHANGE UP (ref 34.5–45)
HCT VFR BLD CALC: 35.8 % — SIGNIFICANT CHANGE UP (ref 34.5–45)
HGB BLD-MCNC: 12 G/DL — SIGNIFICANT CHANGE UP (ref 11.5–15.5)
HGB BLD-MCNC: 12 G/DL — SIGNIFICANT CHANGE UP (ref 11.5–15.5)
HGB BLD-MCNC: 7.6 G/DL — LOW (ref 11.5–15.5)
HGB BLD-MCNC: 7.6 G/DL — LOW (ref 11.5–15.5)
HGB BLD-MCNC: 8.8 G/DL — LOW (ref 11.5–15.5)
HGB BLD-MCNC: 9.5 G/DL — LOW (ref 11.5–15.5)
HGB BLD-MCNC: 9.5 G/DL — LOW (ref 11.5–15.5)
IANC: 11.4 K/UL — HIGH (ref 1.8–7.4)
IANC: 11.4 K/UL — HIGH (ref 1.8–7.4)
IANC: 5.24 K/UL — SIGNIFICANT CHANGE UP (ref 1.8–7.4)
IANC: 5.24 K/UL — SIGNIFICANT CHANGE UP (ref 1.8–7.4)
IMM GRANULOCYTES NFR BLD AUTO: 0.4 % — SIGNIFICANT CHANGE UP (ref 0–0.9)
IMM GRANULOCYTES NFR BLD AUTO: 0.4 % — SIGNIFICANT CHANGE UP (ref 0–0.9)
IMM GRANULOCYTES NFR BLD AUTO: 0.8 % — SIGNIFICANT CHANGE UP (ref 0–0.9)
IMM GRANULOCYTES NFR BLD AUTO: 0.8 % — SIGNIFICANT CHANGE UP (ref 0–0.9)
INR BLD: 0.99 RATIO — SIGNIFICANT CHANGE UP (ref 0.85–1.18)
INR BLD: 0.99 RATIO — SIGNIFICANT CHANGE UP (ref 0.85–1.18)
INR BLD: 1.02 RATIO — SIGNIFICANT CHANGE UP (ref 0.85–1.18)
INR BLD: 1.02 RATIO — SIGNIFICANT CHANGE UP (ref 0.85–1.18)
INR BLD: 1.07 RATIO — SIGNIFICANT CHANGE UP (ref 0.85–1.18)
INR BLD: 1.07 RATIO — SIGNIFICANT CHANGE UP (ref 0.85–1.18)
LACTATE SERPL-SCNC: 4.3 MMOL/L — CRITICAL HIGH (ref 0.5–2)
LACTATE SERPL-SCNC: 4.3 MMOL/L — CRITICAL HIGH (ref 0.5–2)
LYMPHOCYTES # BLD AUTO: 0.82 K/UL — LOW (ref 1–3.3)
LYMPHOCYTES # BLD AUTO: 0.82 K/UL — LOW (ref 1–3.3)
LYMPHOCYTES # BLD AUTO: 1.54 K/UL — SIGNIFICANT CHANGE UP (ref 1–3.3)
LYMPHOCYTES # BLD AUTO: 1.54 K/UL — SIGNIFICANT CHANGE UP (ref 1–3.3)
LYMPHOCYTES # BLD AUTO: 19.5 % — SIGNIFICANT CHANGE UP (ref 13–44)
LYMPHOCYTES # BLD AUTO: 19.5 % — SIGNIFICANT CHANGE UP (ref 13–44)
LYMPHOCYTES # BLD AUTO: 6.1 % — LOW (ref 13–44)
LYMPHOCYTES # BLD AUTO: 6.1 % — LOW (ref 13–44)
MAGNESIUM SERPL-MCNC: 1.3 MG/DL — LOW (ref 1.6–2.6)
MAGNESIUM SERPL-MCNC: 1.3 MG/DL — LOW (ref 1.6–2.6)
MAGNESIUM SERPL-MCNC: 1.4 MG/DL — LOW (ref 1.6–2.6)
MAGNESIUM SERPL-MCNC: 1.4 MG/DL — LOW (ref 1.6–2.6)
MCHC RBC-ENTMCNC: 30.1 PG — SIGNIFICANT CHANGE UP (ref 27–34)
MCHC RBC-ENTMCNC: 30.1 PG — SIGNIFICANT CHANGE UP (ref 27–34)
MCHC RBC-ENTMCNC: 30.2 PG — SIGNIFICANT CHANGE UP (ref 27–34)
MCHC RBC-ENTMCNC: 30.2 PG — SIGNIFICANT CHANGE UP (ref 27–34)
MCHC RBC-ENTMCNC: 30.4 PG — SIGNIFICANT CHANGE UP (ref 27–34)
MCHC RBC-ENTMCNC: 30.4 PG — SIGNIFICANT CHANGE UP (ref 27–34)
MCHC RBC-ENTMCNC: 30.8 PG — SIGNIFICANT CHANGE UP (ref 27–34)
MCHC RBC-ENTMCNC: 30.8 PG — SIGNIFICANT CHANGE UP (ref 27–34)
MCHC RBC-ENTMCNC: 31.2 PG — SIGNIFICANT CHANGE UP (ref 27–34)
MCHC RBC-ENTMCNC: 31.2 PG — SIGNIFICANT CHANGE UP (ref 27–34)
MCHC RBC-ENTMCNC: 31.9 GM/DL — LOW (ref 32–36)
MCHC RBC-ENTMCNC: 31.9 GM/DL — LOW (ref 32–36)
MCHC RBC-ENTMCNC: 33.5 GM/DL — SIGNIFICANT CHANGE UP (ref 32–36)
MCHC RBC-ENTMCNC: 33.5 GM/DL — SIGNIFICANT CHANGE UP (ref 32–36)
MCHC RBC-ENTMCNC: 33.9 GM/DL — SIGNIFICANT CHANGE UP (ref 32–36)
MCHC RBC-ENTMCNC: 33.9 GM/DL — SIGNIFICANT CHANGE UP (ref 32–36)
MCHC RBC-ENTMCNC: 34.5 GM/DL — SIGNIFICANT CHANGE UP (ref 32–36)
MCV RBC AUTO: 87.3 FL — SIGNIFICANT CHANGE UP (ref 80–100)
MCV RBC AUTO: 87.3 FL — SIGNIFICANT CHANGE UP (ref 80–100)
MCV RBC AUTO: 88.1 FL — SIGNIFICANT CHANGE UP (ref 80–100)
MCV RBC AUTO: 88.1 FL — SIGNIFICANT CHANGE UP (ref 80–100)
MCV RBC AUTO: 90.7 FL — SIGNIFICANT CHANGE UP (ref 80–100)
MCV RBC AUTO: 90.7 FL — SIGNIFICANT CHANGE UP (ref 80–100)
MCV RBC AUTO: 93 FL — SIGNIFICANT CHANGE UP (ref 80–100)
MCV RBC AUTO: 93 FL — SIGNIFICANT CHANGE UP (ref 80–100)
MCV RBC AUTO: 94.8 FL — SIGNIFICANT CHANGE UP (ref 80–100)
MCV RBC AUTO: 94.8 FL — SIGNIFICANT CHANGE UP (ref 80–100)
MONOCYTES # BLD AUTO: 0.99 K/UL — HIGH (ref 0–0.9)
MONOCYTES # BLD AUTO: 0.99 K/UL — HIGH (ref 0–0.9)
MONOCYTES # BLD AUTO: 1.07 K/UL — HIGH (ref 0–0.9)
MONOCYTES # BLD AUTO: 1.07 K/UL — HIGH (ref 0–0.9)
MONOCYTES NFR BLD AUTO: 12.5 % — SIGNIFICANT CHANGE UP (ref 2–14)
MONOCYTES NFR BLD AUTO: 12.5 % — SIGNIFICANT CHANGE UP (ref 2–14)
MONOCYTES NFR BLD AUTO: 8 % — SIGNIFICANT CHANGE UP (ref 2–14)
MONOCYTES NFR BLD AUTO: 8 % — SIGNIFICANT CHANGE UP (ref 2–14)
NEUTROPHILS # BLD AUTO: 11.4 K/UL — HIGH (ref 1.8–7.4)
NEUTROPHILS # BLD AUTO: 11.4 K/UL — HIGH (ref 1.8–7.4)
NEUTROPHILS # BLD AUTO: 5.24 K/UL — SIGNIFICANT CHANGE UP (ref 1.8–7.4)
NEUTROPHILS # BLD AUTO: 5.24 K/UL — SIGNIFICANT CHANGE UP (ref 1.8–7.4)
NEUTROPHILS NFR BLD AUTO: 66.4 % — SIGNIFICANT CHANGE UP (ref 43–77)
NEUTROPHILS NFR BLD AUTO: 66.4 % — SIGNIFICANT CHANGE UP (ref 43–77)
NEUTROPHILS NFR BLD AUTO: 85 % — HIGH (ref 43–77)
NEUTROPHILS NFR BLD AUTO: 85 % — HIGH (ref 43–77)
NRBC # BLD: 0 /100 WBCS — SIGNIFICANT CHANGE UP (ref 0–0)
NRBC # FLD: 0 K/UL — SIGNIFICANT CHANGE UP (ref 0–0)
NRBC # FLD: 0.02 K/UL — HIGH (ref 0–0)
NRBC # FLD: 0.02 K/UL — HIGH (ref 0–0)
PHOSPHATE SERPL-MCNC: 4 MG/DL — SIGNIFICANT CHANGE UP (ref 2.5–4.5)
PHOSPHATE SERPL-MCNC: 4 MG/DL — SIGNIFICANT CHANGE UP (ref 2.5–4.5)
PHOSPHATE SERPL-MCNC: 4.5 MG/DL — SIGNIFICANT CHANGE UP (ref 2.5–4.5)
PHOSPHATE SERPL-MCNC: 4.5 MG/DL — SIGNIFICANT CHANGE UP (ref 2.5–4.5)
PLATELET # BLD AUTO: 107 K/UL — LOW (ref 150–400)
PLATELET # BLD AUTO: 107 K/UL — LOW (ref 150–400)
PLATELET # BLD AUTO: 108 K/UL — LOW (ref 150–400)
PLATELET # BLD AUTO: 108 K/UL — LOW (ref 150–400)
PLATELET # BLD AUTO: 152 K/UL — SIGNIFICANT CHANGE UP (ref 150–400)
PLATELET # BLD AUTO: 152 K/UL — SIGNIFICANT CHANGE UP (ref 150–400)
PLATELET # BLD AUTO: 227 K/UL — SIGNIFICANT CHANGE UP (ref 150–400)
POTASSIUM SERPL-MCNC: 3.9 MMOL/L — SIGNIFICANT CHANGE UP (ref 3.5–5.3)
POTASSIUM SERPL-MCNC: 3.9 MMOL/L — SIGNIFICANT CHANGE UP (ref 3.5–5.3)
POTASSIUM SERPL-MCNC: 4.3 MMOL/L — SIGNIFICANT CHANGE UP (ref 3.5–5.3)
POTASSIUM SERPL-MCNC: 4.3 MMOL/L — SIGNIFICANT CHANGE UP (ref 3.5–5.3)
POTASSIUM SERPL-SCNC: 3.9 MMOL/L — SIGNIFICANT CHANGE UP (ref 3.5–5.3)
POTASSIUM SERPL-SCNC: 3.9 MMOL/L — SIGNIFICANT CHANGE UP (ref 3.5–5.3)
POTASSIUM SERPL-SCNC: 4.3 MMOL/L — SIGNIFICANT CHANGE UP (ref 3.5–5.3)
POTASSIUM SERPL-SCNC: 4.3 MMOL/L — SIGNIFICANT CHANGE UP (ref 3.5–5.3)
PROTHROM AB SERPL-ACNC: 11.2 SEC — SIGNIFICANT CHANGE UP (ref 9.5–13)
PROTHROM AB SERPL-ACNC: 11.2 SEC — SIGNIFICANT CHANGE UP (ref 9.5–13)
PROTHROM AB SERPL-ACNC: 11.5 SEC — SIGNIFICANT CHANGE UP (ref 9.5–13)
PROTHROM AB SERPL-ACNC: 11.5 SEC — SIGNIFICANT CHANGE UP (ref 9.5–13)
PROTHROM AB SERPL-ACNC: 12.1 SEC — SIGNIFICANT CHANGE UP (ref 9.5–13)
PROTHROM AB SERPL-ACNC: 12.1 SEC — SIGNIFICANT CHANGE UP (ref 9.5–13)
RBC # BLD: 2.47 M/UL — LOW (ref 3.8–5.2)
RBC # BLD: 2.47 M/UL — LOW (ref 3.8–5.2)
RBC # BLD: 2.91 M/UL — LOW (ref 3.8–5.2)
RBC # BLD: 2.91 M/UL — LOW (ref 3.8–5.2)
RBC # BLD: 2.92 M/UL — LOW (ref 3.8–5.2)
RBC # BLD: 2.92 M/UL — LOW (ref 3.8–5.2)
RBC # BLD: 3.12 M/UL — LOW (ref 3.8–5.2)
RBC # BLD: 3.12 M/UL — LOW (ref 3.8–5.2)
RBC # BLD: 3.85 M/UL — SIGNIFICANT CHANGE UP (ref 3.8–5.2)
RBC # BLD: 3.85 M/UL — SIGNIFICANT CHANGE UP (ref 3.8–5.2)
RBC # FLD: 13.3 % — SIGNIFICANT CHANGE UP (ref 10.3–14.5)
RBC # FLD: 13.3 % — SIGNIFICANT CHANGE UP (ref 10.3–14.5)
RBC # FLD: 13.4 % — SIGNIFICANT CHANGE UP (ref 10.3–14.5)
RBC # FLD: 13.4 % — SIGNIFICANT CHANGE UP (ref 10.3–14.5)
RBC # FLD: 13.5 % — SIGNIFICANT CHANGE UP (ref 10.3–14.5)
RBC # FLD: 13.5 % — SIGNIFICANT CHANGE UP (ref 10.3–14.5)
RBC # FLD: 13.8 % — SIGNIFICANT CHANGE UP (ref 10.3–14.5)
RBC # FLD: 13.8 % — SIGNIFICANT CHANGE UP (ref 10.3–14.5)
RBC # FLD: 14.1 % — SIGNIFICANT CHANGE UP (ref 10.3–14.5)
RBC # FLD: 14.1 % — SIGNIFICANT CHANGE UP (ref 10.3–14.5)
RH IG SCN BLD-IMP: POSITIVE — SIGNIFICANT CHANGE UP
RH IG SCN BLD-IMP: POSITIVE — SIGNIFICANT CHANGE UP
SODIUM SERPL-SCNC: 138 MMOL/L — SIGNIFICANT CHANGE UP (ref 135–145)
T PALLIDUM AB TITR SER: NEGATIVE — SIGNIFICANT CHANGE UP
T PALLIDUM AB TITR SER: NEGATIVE — SIGNIFICANT CHANGE UP
WBC # BLD: 10.5 K/UL — SIGNIFICANT CHANGE UP (ref 3.8–10.5)
WBC # BLD: 10.5 K/UL — SIGNIFICANT CHANGE UP (ref 3.8–10.5)
WBC # BLD: 13.42 K/UL — HIGH (ref 3.8–10.5)
WBC # BLD: 13.42 K/UL — HIGH (ref 3.8–10.5)
WBC # BLD: 15.88 K/UL — HIGH (ref 3.8–10.5)
WBC # BLD: 15.88 K/UL — HIGH (ref 3.8–10.5)
WBC # BLD: 7.89 K/UL — SIGNIFICANT CHANGE UP (ref 3.8–10.5)
WBC # BLD: 7.89 K/UL — SIGNIFICANT CHANGE UP (ref 3.8–10.5)
WBC # BLD: 9.28 K/UL — SIGNIFICANT CHANGE UP (ref 3.8–10.5)
WBC # BLD: 9.28 K/UL — SIGNIFICANT CHANGE UP (ref 3.8–10.5)
WBC # FLD AUTO: 10.5 K/UL — SIGNIFICANT CHANGE UP (ref 3.8–10.5)
WBC # FLD AUTO: 10.5 K/UL — SIGNIFICANT CHANGE UP (ref 3.8–10.5)
WBC # FLD AUTO: 13.42 K/UL — HIGH (ref 3.8–10.5)
WBC # FLD AUTO: 13.42 K/UL — HIGH (ref 3.8–10.5)
WBC # FLD AUTO: 15.88 K/UL — HIGH (ref 3.8–10.5)
WBC # FLD AUTO: 15.88 K/UL — HIGH (ref 3.8–10.5)
WBC # FLD AUTO: 7.89 K/UL — SIGNIFICANT CHANGE UP (ref 3.8–10.5)
WBC # FLD AUTO: 7.89 K/UL — SIGNIFICANT CHANGE UP (ref 3.8–10.5)
WBC # FLD AUTO: 9.28 K/UL — SIGNIFICANT CHANGE UP (ref 3.8–10.5)
WBC # FLD AUTO: 9.28 K/UL — SIGNIFICANT CHANGE UP (ref 3.8–10.5)

## 2023-11-14 PROCEDURE — 49000 EXPLORATION OF ABDOMEN: CPT | Mod: GC

## 2023-11-14 PROCEDURE — 59515 CESAREAN DELIVERY: CPT | Mod: U9,GC

## 2023-11-14 PROCEDURE — 97606 NEG PRS WND THER DME>50 SQCM: CPT | Mod: GC

## 2023-11-14 PROCEDURE — 74174 CTA ABD&PLVS W/CONTRAST: CPT | Mod: 26

## 2023-11-14 PROCEDURE — 71045 X-RAY EXAM CHEST 1 VIEW: CPT | Mod: 26

## 2023-11-14 PROCEDURE — 88302 TISSUE EXAM BY PATHOLOGIST: CPT | Mod: 26

## 2023-11-14 PROCEDURE — 99291 CRITICAL CARE FIRST HOUR: CPT | Mod: 57,GC

## 2023-11-14 RX ORDER — SIMETHICONE 80 MG/1
80 TABLET, CHEWABLE ORAL EVERY 4 HOURS
Refills: 0 | Status: DISCONTINUED | OUTPATIENT
Start: 2023-11-14 | End: 2023-11-14

## 2023-11-14 RX ORDER — SODIUM CHLORIDE 9 MG/ML
1000 INJECTION, SOLUTION INTRAVENOUS
Refills: 0 | Status: DISCONTINUED | OUTPATIENT
Start: 2023-11-14 | End: 2023-11-14

## 2023-11-14 RX ORDER — SODIUM CHLORIDE 9 MG/ML
500 INJECTION, SOLUTION INTRAVENOUS ONCE
Refills: 0 | Status: DISCONTINUED | OUTPATIENT
Start: 2023-11-14 | End: 2023-11-14

## 2023-11-14 RX ORDER — HEPARIN SODIUM 5000 [USP'U]/ML
5000 INJECTION INTRAVENOUS; SUBCUTANEOUS EVERY 12 HOURS
Refills: 0 | Status: DISCONTINUED | OUTPATIENT
Start: 2023-11-14 | End: 2023-11-14

## 2023-11-14 RX ORDER — SODIUM CHLORIDE 9 MG/ML
1000 INJECTION, SOLUTION INTRAVENOUS
Refills: 0 | Status: DISCONTINUED | OUTPATIENT
Start: 2023-11-14 | End: 2023-11-16

## 2023-11-14 RX ORDER — DIPHENHYDRAMINE HCL 50 MG
25 CAPSULE ORAL ONCE
Refills: 0 | Status: COMPLETED | OUTPATIENT
Start: 2023-11-14 | End: 2023-11-14

## 2023-11-14 RX ORDER — MAGNESIUM SULFATE 500 MG/ML
4 VIAL (ML) INJECTION ONCE
Refills: 0 | Status: COMPLETED | OUTPATIENT
Start: 2023-11-14 | End: 2023-11-14

## 2023-11-14 RX ORDER — MAGNESIUM SULFATE 500 MG/ML
4 VIAL (ML) INJECTION
Refills: 0 | Status: DISCONTINUED | OUTPATIENT
Start: 2023-11-14 | End: 2023-11-14

## 2023-11-14 RX ORDER — FENTANYL CITRATE 50 UG/ML
0.5 INJECTION INTRAVENOUS
Qty: 2500 | Refills: 0 | Status: DISCONTINUED | OUTPATIENT
Start: 2023-11-14 | End: 2023-11-16

## 2023-11-14 RX ORDER — PROPOFOL 10 MG/ML
20 INJECTION, EMULSION INTRAVENOUS
Qty: 1000 | Refills: 0 | Status: DISCONTINUED | OUTPATIENT
Start: 2023-11-14 | End: 2023-11-14

## 2023-11-14 RX ORDER — SODIUM CHLORIDE 9 MG/ML
1000 INJECTION, SOLUTION INTRAVENOUS ONCE
Refills: 0 | Status: COMPLETED | OUTPATIENT
Start: 2023-11-14 | End: 2023-11-14

## 2023-11-14 RX ORDER — DIPHENHYDRAMINE HCL 50 MG
25 CAPSULE ORAL EVERY 6 HOURS
Refills: 0 | Status: DISCONTINUED | OUTPATIENT
Start: 2023-11-14 | End: 2023-11-14

## 2023-11-14 RX ORDER — FENTANYL CITRATE 50 UG/ML
0.5 INJECTION INTRAVENOUS
Qty: 2500 | Refills: 0 | Status: DISCONTINUED | OUTPATIENT
Start: 2023-11-14 | End: 2023-11-14

## 2023-11-14 RX ORDER — MAGNESIUM HYDROXIDE 400 MG/1
30 TABLET, CHEWABLE ORAL
Refills: 0 | Status: DISCONTINUED | OUTPATIENT
Start: 2023-11-14 | End: 2023-11-14

## 2023-11-14 RX ORDER — CHLORHEXIDINE GLUCONATE 213 G/1000ML
15 SOLUTION TOPICAL EVERY 12 HOURS
Refills: 0 | Status: DISCONTINUED | OUTPATIENT
Start: 2023-11-14 | End: 2023-11-16

## 2023-11-14 RX ORDER — INFLUENZA VIRUS VACCINE 15; 15; 15; 15 UG/.5ML; UG/.5ML; UG/.5ML; UG/.5ML
0.5 SUSPENSION INTRAMUSCULAR ONCE
Refills: 0 | Status: DISCONTINUED | OUTPATIENT
Start: 2023-11-14 | End: 2023-11-23

## 2023-11-14 RX ORDER — PIPERACILLIN AND TAZOBACTAM 4; .5 G/20ML; G/20ML
3.38 INJECTION, POWDER, LYOPHILIZED, FOR SOLUTION INTRAVENOUS EVERY 8 HOURS
Refills: 0 | Status: DISCONTINUED | OUTPATIENT
Start: 2023-11-15 | End: 2023-11-17

## 2023-11-14 RX ORDER — ONDANSETRON 8 MG/1
4 TABLET, FILM COATED ORAL EVERY 6 HOURS
Refills: 0 | Status: DISCONTINUED | OUTPATIENT
Start: 2023-11-14 | End: 2023-11-14

## 2023-11-14 RX ORDER — LANOLIN
1 OINTMENT (GRAM) TOPICAL EVERY 6 HOURS
Refills: 0 | Status: DISCONTINUED | OUTPATIENT
Start: 2023-11-14 | End: 2023-11-14

## 2023-11-14 RX ORDER — PIPERACILLIN AND TAZOBACTAM 4; .5 G/20ML; G/20ML
3.38 INJECTION, POWDER, LYOPHILIZED, FOR SOLUTION INTRAVENOUS EVERY 8 HOURS
Refills: 0 | Status: DISCONTINUED | OUTPATIENT
Start: 2023-11-14 | End: 2023-11-14

## 2023-11-14 RX ORDER — POTASSIUM CHLORIDE 20 MEQ
10 PACKET (EA) ORAL
Refills: 0 | Status: COMPLETED | OUTPATIENT
Start: 2023-11-14 | End: 2023-11-14

## 2023-11-14 RX ORDER — FAMOTIDINE 10 MG/ML
20 INJECTION INTRAVENOUS ONCE
Refills: 0 | Status: COMPLETED | OUTPATIENT
Start: 2023-11-14 | End: 2023-11-14

## 2023-11-14 RX ORDER — OXYCODONE HYDROCHLORIDE 5 MG/1
5 TABLET ORAL
Refills: 0 | Status: DISCONTINUED | OUTPATIENT
Start: 2023-11-14 | End: 2023-11-14

## 2023-11-14 RX ORDER — KETOROLAC TROMETHAMINE 30 MG/ML
30 SYRINGE (ML) INJECTION EVERY 6 HOURS
Refills: 0 | Status: DISCONTINUED | OUTPATIENT
Start: 2023-11-14 | End: 2023-11-14

## 2023-11-14 RX ORDER — TETANUS TOXOID, REDUCED DIPHTHERIA TOXOID AND ACELLULAR PERTUSSIS VACCINE, ADSORBED 5; 2.5; 8; 8; 2.5 [IU]/.5ML; [IU]/.5ML; UG/.5ML; UG/.5ML; UG/.5ML
0.5 SUSPENSION INTRAMUSCULAR ONCE
Refills: 0 | Status: DISCONTINUED | OUTPATIENT
Start: 2023-11-14 | End: 2023-11-14

## 2023-11-14 RX ORDER — OXYTOCIN 10 UNIT/ML
333.33 VIAL (ML) INJECTION
Qty: 20 | Refills: 0 | Status: DISCONTINUED | OUTPATIENT
Start: 2023-11-14 | End: 2023-11-14

## 2023-11-14 RX ORDER — PROPOFOL 10 MG/ML
20 INJECTION, EMULSION INTRAVENOUS
Qty: 1000 | Refills: 0 | Status: DISCONTINUED | OUTPATIENT
Start: 2023-11-14 | End: 2023-11-16

## 2023-11-14 RX ORDER — OXYCODONE HYDROCHLORIDE 5 MG/1
5 TABLET ORAL ONCE
Refills: 0 | Status: DISCONTINUED | OUTPATIENT
Start: 2023-11-14 | End: 2023-11-14

## 2023-11-14 RX ORDER — ACETAMINOPHEN 500 MG
975 TABLET ORAL
Refills: 0 | Status: DISCONTINUED | OUTPATIENT
Start: 2023-11-14 | End: 2023-11-14

## 2023-11-14 RX ORDER — CITRIC ACID/SODIUM CITRATE 300-500 MG
30 SOLUTION, ORAL ORAL ONCE
Refills: 0 | Status: COMPLETED | OUTPATIENT
Start: 2023-11-14 | End: 2023-11-14

## 2023-11-14 RX ORDER — SODIUM CHLORIDE 9 MG/ML
1000 INJECTION, SOLUTION INTRAVENOUS ONCE
Refills: 0 | Status: DISCONTINUED | OUTPATIENT
Start: 2023-11-14 | End: 2023-11-14

## 2023-11-14 RX ORDER — PIPERACILLIN AND TAZOBACTAM 4; .5 G/20ML; G/20ML
3.38 INJECTION, POWDER, LYOPHILIZED, FOR SOLUTION INTRAVENOUS ONCE
Refills: 0 | Status: COMPLETED | OUTPATIENT
Start: 2023-11-14 | End: 2023-11-14

## 2023-11-14 RX ORDER — IBUPROFEN 200 MG
600 TABLET ORAL EVERY 6 HOURS
Refills: 0 | Status: DISCONTINUED | OUTPATIENT
Start: 2023-11-14 | End: 2023-11-14

## 2023-11-14 RX ADMIN — FENTANYL CITRATE 3.72 MICROGRAM(S)/KG/HR: 50 INJECTION INTRAVENOUS at 18:14

## 2023-11-14 RX ADMIN — PIPERACILLIN AND TAZOBACTAM 200 GRAM(S): 4; .5 INJECTION, POWDER, LYOPHILIZED, FOR SOLUTION INTRAVENOUS at 20:11

## 2023-11-14 RX ADMIN — SODIUM CHLORIDE 1000 MILLILITER(S): 9 INJECTION, SOLUTION INTRAVENOUS at 10:42

## 2023-11-14 RX ADMIN — SODIUM CHLORIDE 1000 MILLILITER(S): 9 INJECTION, SOLUTION INTRAVENOUS at 12:00

## 2023-11-14 RX ADMIN — PROPOFOL 8.93 MICROGRAM(S)/KG/MIN: 10 INJECTION, EMULSION INTRAVENOUS at 20:10

## 2023-11-14 RX ADMIN — SODIUM CHLORIDE 75 MILLILITER(S): 9 INJECTION, SOLUTION INTRAVENOUS at 11:26

## 2023-11-14 RX ADMIN — Medication 25 GRAM(S): at 22:35

## 2023-11-14 RX ADMIN — FENTANYL CITRATE 3.72 MICROGRAM(S)/KG/HR: 50 INJECTION INTRAVENOUS at 20:10

## 2023-11-14 RX ADMIN — SODIUM CHLORIDE 100 MILLILITER(S): 9 INJECTION, SOLUTION INTRAVENOUS at 17:38

## 2023-11-14 RX ADMIN — SODIUM CHLORIDE 100 MILLILITER(S): 9 INJECTION, SOLUTION INTRAVENOUS at 18:14

## 2023-11-14 RX ADMIN — Medication 25 MILLIGRAM(S): at 11:23

## 2023-11-14 RX ADMIN — FAMOTIDINE 20 MILLIGRAM(S): 10 INJECTION INTRAVENOUS at 08:21

## 2023-11-14 RX ADMIN — Medication 30 MILLILITER(S): at 08:25

## 2023-11-14 RX ADMIN — PROPOFOL 8.93 MICROGRAM(S)/KG/MIN: 10 INJECTION, EMULSION INTRAVENOUS at 17:38

## 2023-11-14 RX ADMIN — CHLORHEXIDINE GLUCONATE 15 MILLILITER(S): 213 SOLUTION TOPICAL at 17:39

## 2023-11-14 RX ADMIN — ONDANSETRON 4 MILLIGRAM(S): 8 TABLET, FILM COATED ORAL at 10:49

## 2023-11-14 RX ADMIN — Medication 1000 MILLIUNIT(S)/MIN: at 11:25

## 2023-11-14 RX ADMIN — Medication 100 MILLIEQUIVALENT(S): at 20:42

## 2023-11-14 NOTE — OB PROVIDER H&P - NSLOWPPHRISK_OBGYN_A_OB
[place] Pregnancy/Less than or equal to 4 previous vaginal births/No known bleeding disorder/No history of postpartum hemorrhage/No other PPH risks indicated

## 2023-11-14 NOTE — OB RN PATIENT PROFILE - FALL HARM RISK - UNIVERSAL INTERVENTIONS
Bed in lowest position, wheels locked, appropriate side rails in place/Call bell, personal items and telephone in reach/Instruct patient to call for assistance before getting out of bed or chair/Non-slip footwear when patient is out of bed/[place] to call system/Physically safe environment - no spills, clutter or unnecessary equipment/Purposeful Proactive Rounding/Room/bathroom lighting operational, light cord in reach

## 2023-11-14 NOTE — CONSULT NOTE ADULT - ATTENDING COMMENTS
intra-op consult for abdominal hemorrhage after repeat   refer to brief and dictated op notes for details  ABThera, CTA, SICU and then abd closure once CTA finalized and patient resuscitated  hand-off given to B team
I agree with the history, physical, and plan, which I have reviewed and edited where appropriate.  I agree with notes/assessment of health care providers on my service.  I have personally examined the patient.  I was physically present for the key portions of the evaluation and management (E/M) service provided.  I reviewed data and laboratory tests/x-rays and all pertinent electronic images.  The patient is a critical care patient with life threatening hemodynamic and metabolic instability in SICU.  Risk benefit analyses discussed.    The patient is in SICU with diagnosis mentioned in the note.    The plan is specified below.    33-year-old female with a history of , presented for repeat  and bilateral salpingectomy s/p  and b/l salpingectomy  with a QBL of 363cc, UOP 350cc, received two liters of crystalloid. Two hours post-operatively, the patient developed hypotension and bleeding from her incision for which she was taken back to the OR. General surgery consulted intraoperatively for bleeding of unknown origin, bleeding unable to be localized and appeared to stop, abthera vac placed. CTa negative    PLAN: Neurologic:   - Propofol and fentanyl for sedation and pain, wean as tolerated     Respiratory:   - MVent 450/14/5/40%    Cardiovascular:   - Stable not on pressors    Gastrointestinal/Nutrition: open abdomen with abthera   - NPO  - Plan RTOR in next 24-48h     Renal/Genitourinary:   - LR @ 100     Hematologic:   - SCDs  - serial cbc     Infectious Disease: open abdomen prophylaxis per gyn  - Zosyn    Endocrine:   - Stable

## 2023-11-14 NOTE — OB POSTPARTUM EVENT NOTE - NS_EVENTPTSUMMARY1_OBGYN_ALL_OB_FT
, IV fluids 2 L, Urine output  350CC  fundus firm, 2 below umbilicus, light bleeding   pitocin 125 cc/hr, LR at 75 cc/hr  HR 89, BP 55/37, RR 16, SPO2 100  Repeat BP 62/37

## 2023-11-14 NOTE — CONSULT NOTE ADULT - ASSESSMENT
33-year-old female with a history of , presented for repeat  and bilateral salpingectomy today. Patient now s/p  and b/l salpingectomy earlier today, with a QBL of 363cc, UOP 350cc, received two liters of crystalloid. Two hours post-operatively, the patient developed hypotension and bleeding from her incision for which she was taken back to the OR. General surgery consulted intraoperatively for bleeding of unknown origin. At time of consult, patient's EBL was 2000cc s/p 2u pRBCs, 1u FFP. Patient hypotensive to 70 systolic, not on pressors.     Recommendations:  - General surgery to assist intraoperatively to locate and control the source of bleeding.  - Surgery will follow.    Discussed with Dr. Melara.    B Team Surgery  Please page [phone] for all questions.

## 2023-11-14 NOTE — BRIEF OPERATIVE NOTE - OPERATION/FINDINGS
rLTCS + Bilateral salpingectomy   Viable male infant, apgar / wgt 3800 gms  delivered @ 9:19am   cephalic presentation, clear copious fluid  hysterotomy closed in single layer  bilateral salpingectomy with ligasure  otherwise grossly nl uterus, tubes & ovaries    QBL: 363 cc   UOP: 350 cc   IVF: 2000 cc   Dictation Number: [id number]

## 2023-11-14 NOTE — OB RN DELIVERY SUMMARY - NSSELHIDDEN_OBGYN_ALL_OB_FT
[NS_DeliveryAttending1_OBGYN_ALL_OB_FT:[id number]],[NS_DeliveryAssist1_OBGYN_ALL_OB_FT:[id number]],[NS_DeliveryRN_OBGYN_ALL_OB_FT:[id number]]

## 2023-11-14 NOTE — OB POSTPARTUM EVENT NOTE - NS_EVENTSUMMARY1_OBGYN_ALL_OB_FT
patient vomiting, head of bed raised, emesis basin given, BP 55/37, patient pale, patient asking for anti nausea medication and complaining of feeling cold

## 2023-11-14 NOTE — OB PROVIDER DELIVERY SUMMARY - NSSELHIDDEN_OBGYN_ALL_OB_FT
[NS_DeliveryAttending1_OBGYN_ALL_OB_FT:[id number]],[NS_DeliveryAssist1_OBGYN_ALL_OB_FT:[id number]],[NS_DeliveryRN_OBGYN_ALL_OB_FT:[id number]]

## 2023-11-14 NOTE — OB PROVIDER H&P - NS_OBGYNHISTORY_OBGYN_ALL_OB_FT
OB History: : 2011, , FT, Female, 7 lb 8 oz, delivered in [place], uncomplicated as per patient  G2: 2014, , FT, Female, 6 lb 10 oz, delivered in [place], uncomplicated as per patient  G3: 10/2/2017, primary  for oligohydramnios with dr stating not to try labor, FT, Female, delivered in [place], uncomplicated as per patient  G4: Current pregnancy, late transfer of care from [place] 2023  - Elevated GCT, GTT wnl  - GBS positive 10/25  Patient denies HTN/DM/Fetal issues    Prenatal Labs Reviewed:  T&S: A+  Rubella: Not found in chart  Hep B: Not found in chart  HIV: Neg  RPR: Neg  GCT: 151  GTT: 82/140/137/107  G/C: not found in chart  GBS: Positive 10/25    Ultrasounds:   10/9: Vertex, anterior placenta, BPP , EFW 2435 g (62%ile)     GYN Hx: Denies fibroids, ovarian cysts, HSV/ STDs, abnormal pap smears

## 2023-11-14 NOTE — OB PROVIDER DELIVERY SUMMARY - NSPROVIDERDELIVERYNOTE_OBGYN_ALL_OB_FT
rLTCS + Bilateral salpingectomy   Viable male infant, apgar 9/9 wgt 3800 gms  delivered @ 9:19am   cephalic presentation, clear copious fluid  hysterotomy closed in single layer  bilateral salpingectomy with ligasure  otherwise grossly nl uterus, tubes & ovaries    QBL: 363 cc   UOP: 350 cc   IVF: 2000 cc   Dictation Number: [id number]   -23 @ 10:48

## 2023-11-14 NOTE — OB RN DELIVERY SUMMARY - BABY A: APGAR 1 MIN SCORE, DELIVERY
MD aware and at bedside. STAT EKG ordered. IVPB Tylenol ordered for pain management. MD Knowles notified. MD aware and at bedside. MD notified 9

## 2023-11-14 NOTE — CONSULT NOTE ADULT - SUBJECTIVE AND OBJECTIVE BOX
=====================================================                                                             SICU Consultation Note                                                             =====================================================  Patient is a 33y old  Female who presents with a chief complaint of   HPI: 33 year old  @ 39.0 weeks s/p  section with salpingectomy EBL 363cc, UOP 350cc . Post op patient hypotensive and tachycardic, + Fast, went to the OR found to have 1 L of blood in abdomen general surgery called for unknown source of bleeding, bleeding appeared to have stopped, abthera vac placed and patient went to CT scan for angiogram and brought to SICU for monitoring.    Surgery Information  ***  Case Duration:      EBL:       IV Fluids:       Blood Products:         Complications:        HISTORY  33y Female  Allergies: No Known Allergies    PAST MEDICAL & SURGICAL HISTORY:  Previous  section  H/O:     SOCIAL HISTORY:    ADVANCE DIRECTIVES: Presumed Full Code    REVIEW OF SYSTEMS:    General: Non-Contributory  Skin/Breast: Non-Contributory  Ophthalmologic: Non-Contributory  ENMT: Non-Contributory  Respiratory and Thorax: Non-Contributory  Cardiovascular: Non-Contributory  Gastrointestinal: Non-Contributory  Genitourinary: Non-Contributory  Musculoskeletal: Non-Contributory  Neurological: Non-Contributory  Psychiatric: Non-Contributory  Hematology/Lymphatics: Non-Contributory  Endocrine: Non-Contributory  Allergic/Immunologic: Non-Contributory  HOME MEDICATIONS: Folic acid  CURRENT MEDICATIONS:   --------------------------------------------------------------------------------------  Gastrointestinal Medications  lactated ringers Bolus 1000 milliLiter(s) IV Bolus once  lactated ringers Bolus 500 milliLiter(s) IV Bolus once  --------------------------------------------------------------------------------------  VITAL SIGNS, INS/OUTS (last 24 hours):  Vital Signs Last 24 Hrs  T(C): 36.5 (2023 07:16), Max: 36.5 (2023 06:40)  T(F): 97.7 (2023 07:16), Max: 97.7 (2023 06:40)  HR: 109 (2023 12:30) (57 - 112)  BP: 103/59 (2023 12:30) (55/37 - 119/91)  BP(mean): 67 (2023 12:30) (41 - 96)  RR: 17 (2023 12:30) (14 - 22)  SpO2: 100% (2023 12:30) (99% - 100%)  Parameters below as of 2023 10:30  Patient On (Oxygen Delivery Method): room air  CAPILLARY BLOOD GLUCOSE  I&O's Detail  2023 07:01  -  2023 07:00  --------------------------------------------------------  IN:    Lactated Ringers: 400 mL  Total IN: 400 mL  OUT:  Total OUT: 0 m  Total NET: 400 mL  2023 07:01  -  2023 14:55  --------------------------------------------------------  IN:    Lactated Ringers: 400 mL    Lactated Ringers: 187.5 mL    Lactated Ringers Bolus: 2000 mL    Other (mL): 2000 mL    Oxytocin: 312.5 mL  Total IN: 4900 mL  OUT:    Blood Loss (mL): 363 mL    Indwelling Catheter - Urethral (mL): 540 mL  Total OUT: 903 mL  Total NET: 3997 mL-    EXAM  NEUROLOGY  Exam  HEENT  Exam: Normocephalic, atraumatic.  EOMI   RESPIRATORY  Exam: Lungs clear to auscultation, Normal expansion/effort.    Mechanical Ventilation:   CARDIOVASCULAR  Exam: S1, S2.  Regular rate and rhythm.    GI/NUTRITION  Exam: Abdomen  Current Diet:  NPO  VASCULAR  Exam: Extremities warm, pink, well-perfused.   MUSCULOSKELETAL  Exam: All extremities moving spontaneously without limitations.    SKIN:  Exam: Good skin turgor, no skin breakdown.    METABOLIC/FLUIDS/ELECTROLYTES  lactated ringers Bolus 1000 milliLiter(s) IV Bolus once  lactated ringers Bolus 500 milliLiter(s) IV Bolus once  HEMATOLOGIC  [x] DVT Prophylaxis:   Transfusions:	[] PRBC	[] Platelets		[] FFP	[] Cryoprecipitate  INFECTIOUS DISEASE  Antimicrobials/Immunologic Medications:  influenza   Vaccine 0.5 milliLiter(s) IntraMuscular once    Day #  	of    ***  Tubes/Lines/Drains  ***  [x] Peripheral IV  [] Central Venous Line     	[] R	[] L	[] IJ	[] Fem	[] SC	Date Placed:   [] Arterial Line		[] R	[] L	[] Fem	[] Rad	[] Ax	Date Placed:   [] PICC:         	[] Midline		[] Mediport  [] Urinary Catheter		Date Placed:     LAB                                          7.6                   Neurophils% (auto):   85.0   ( @ 14:15):    13.42)-----------(152          Lymphocytes% (auto):  6.1                                           22.4                   Eosinphils% (auto):   0.0    Manual%: Neutrophils x    ; Lymphocytes x    ; Eosinophils x    ; Bands%: x    ; Blasts x      (  @ 14:15 )   PT: 12.1 sec;   INR: 1.07 ratio  aPTT: 27.3 sec  ABG - ( 2023 14:36 )  pH: 7.33  /  pCO2: 32    /  pO2: 249   / HCO3: 17    / Base Excess: -8.2  /  SaO2: 99.3  / Lactate: x          OTHER LABS  IMAGING RESULTS  Echo:   CT:                                                                   =====================================================                                                             SICU Consultation Note                                                             =====================================================  Patient is a 33y old  Female who presents with a chief complaint of   HPI: 33 year old  @ 39.0 weeks s/p  section with salpingectomy EBL 363cc, UOP 350cc . Post op patient hypotensive and tachycardic, + Fast, went to the OR found to have 1 L of blood in abdomen general surgery called for unknown source of bleeding, bleeding appeared to have stopped, abthera vac placed and patient went to CT scan for angiogram and brought to SICU for monitoring. Ct angio negative for bleed    Surgery Information  ***   EBL:  3.547  L  IV Fluids:   5.1 L   Blood Products: 5 U BRBC, 4 U FFP 1 Platelet 1 TXA 2 U Riastap  Complications:        HISTORY  33y Female  Allergies: No Known Allergies    PAST MEDICAL & SURGICAL HISTORY:  Previous  section  H/O:     SOCIAL HISTORY:    ADVANCE DIRECTIVES: Presumed Full Code    REVIEW OF SYSTEMS:    General: Non-Contributory  Skin/Breast: Non-Contributory  Ophthalmologic: Non-Contributory  ENMT: Non-Contributory  Respiratory and Thorax: Non-Contributory  Cardiovascular: Non-Contributory  Gastrointestinal: Non-Contributory  Genitourinary: Non-Contributory  Musculoskeletal: Non-Contributory  Neurological: Non-Contributory  Psychiatric: Non-Contributory  Hematology/Lymphatics: Non-Contributory  Endocrine: Non-Contributory  Allergic/Immunologic: Non-Contributory  HOME MEDICATIONS: Folic acid  CURRENT MEDICATIONS:   --------------------------------------------------------------------------------------  Gastrointestinal Medications  lactated ringers Bolus 1000 milliLiter(s) IV Bolus once  lactated ringers Bolus 500 milliLiter(s) IV Bolus once  --------------------------------------------------------------------------------------  VITAL SIGNS, INS/OUTS (last 24 hours):  Vital Signs Last 24 Hrs  T(C): 36.5 (2023 07:16), Max: 36.5 (2023 06:40)  T(F): 97.7 (2023 07:16), Max: 97.7 (2023 06:40)  HR: 109 (2023 12:30) (57 - 112)  BP: 103/59 (2023 12:30) (55/37 - 119/91)  BP(mean): 67 (2023 12:30) (41 - 96)  RR: 17 (2023 12:30) (14 - 22)  SpO2: 100% (2023 12:30) (99% - 100%)  Parameters below as of 2023 10:30  Patient On (Oxygen Delivery Method): room air  CAPILLARY BLOOD GLUCOSE  I&O's Detail  2023 07:  -  2023 07:00  --------------------------------------------------------  IN:    Lactated Ringers: 400 mL  Total IN: 400 mL  OUT:  Total OUT: 0 m  Total NET: 400 mL  2023 07:01  -  2023 14:55  --------------------------------------------------------  IN:    Lactated Ringers: 400 mL    Lactated Ringers: 187.5 mL    Lactated Ringers Bolus: 2000 mL    Other (mL): 2000 mL    Oxytocin: 312.5 mL  Total IN: 4900 mL  OUT:    Blood Loss (mL): 363 mL    Indwelling Catheter - Urethral (mL): 540 mL  Total OUT: 903 mL  Total NET: 3997 mL-    EXAM  NEUROLOGY  Exam appears comfortable intubated and sedated  HEENT  Exam: Normocephalic, atraumatic.  EOMI   RESPIRATORY  Exam: Lungs clear to auscultation, Normal expansion/effort.    Mechanical Ventilation:   CARDIOVASCULAR  Exam: S1, S2.  Regular rate and rhythm.    GI/NUTRITION  Exam: Abdomen soft, abthera vac on suction  Current Diet:  NPO  VASCULAR  Exam: Extremities warm, pink, well-perfused.   MUSCULOSKELETAL  Exam: All extremities moving spontaneously without limitations.    SKIN:  Exam: Good skin turgor, no skin breakdown.    METABOLIC/FLUIDS/ELECTROLYTES  lactated ringers Bolus 1000 milliLiter(s) IV Bolus once  lactated ringers Bolus 500 milliLiter(s) IV Bolus once  HEMATOLOGIC  [x] DVT Prophylaxis:   Transfusions:	[] PRBC	[] Platelets		[] FFP	[] Cryoprecipitate  INFECTIOUS DISEASE  Antimicrobials/Immunologic Medications:  influenza   Vaccine 0.5 milliLiter(s) IntraMuscular once    Day #  	of    ***  Tubes/Lines/Drains  ***  [x] Peripheral IV  [] Central Venous Line     	[] R	[] L	[] IJ	[] Fem	[] SC	Date Placed:   [] Arterial Line		[] R	[] L	[] Fem	[] Rad	[] Ax	Date Placed:   [] PICC:         	[] Midline		[] Mediport  [] Urinary Catheter		Date Placed:     LAB                                          7.6                   Neurophils% (auto):   85.0   ( @ 14:15):    13.42)-----------(152          Lymphocytes% (auto):  6.1                                           22.4                   Eosinphils% (auto):   0.0    Manual%: Neutrophils x    ; Lymphocytes x    ; Eosinophils x    ; Bands%: x    ; Blasts x      (  @ 14:15 )   PT: 12.1 sec;   INR: 1.07 ratio  aPTT: 27.3 sec  ABG - ( 2023 14:36 )  pH: 7.33  /  pCO2: 32    /  pO2: 249   / HCO3: 17    / Base Excess: -8.2  /  SaO2: 99.3  / Lactate: x          OTHER LABS  IMAGING RESULTS  Echo:   CT:                                                                   =====================================================                                                             SICU Consultation Note                                                             =====================================================  Patient is a 33y old  Female who presents with a chief complaint of   HPI: 33 year old  @ 39.0 weeks s/p  section with salpingectomy EBL 363cc, UOP 350cc . Post op patient hypotensive and tachycardic, + Fast, went to the OR found to have 1 L of blood in abdomen general surgery called for unknown source of bleeding, bleeding appeared to have stopped, abthera vac placed and patient went to CT scan for angiogram and brought to SICU for monitoring. Ct angio negative for bleed    Surgery Information    EBL:  3.547  L  IV Fluids:   5.1 L   Blood Products: 5 U BRBC, 4 U FFP 1 Platelet 1 TXA 2 U Riastap  Complications:        HISTORY  33y Female  Allergies: No Known Allergies    PAST MEDICAL & SURGICAL HISTORY:  Previous  section  H/O:     SOCIAL HISTORY:    ADVANCE DIRECTIVES: Presumed Full Code    REVIEW OF SYSTEMS:    General: Non-Contributory  Skin/Breast: Non-Contributory  Ophthalmologic: Non-Contributory  ENMT: Non-Contributory  Respiratory and Thorax: Non-Contributory  Cardiovascular: Non-Contributory  Gastrointestinal: Non-Contributory  Genitourinary: Non-Contributory  Musculoskeletal: Non-Contributory  Neurological: Non-Contributory  Psychiatric: Non-Contributory  Hematology/Lymphatics: Non-Contributory  Endocrine: Non-Contributory  Allergic/Immunologic: Non-Contributory  HOME MEDICATIONS: Folic acid  CURRENT MEDICATIONS:   --------------------------------------------------------------------------------------  Gastrointestinal Medications  lactated ringers Bolus 1000 milliLiter(s) IV Bolus once  lactated ringers Bolus 500 milliLiter(s) IV Bolus once  --------------------------------------------------------------------------------------  VITAL SIGNS, INS/OUTS (last 24 hours):  Vital Signs Last 24 Hrs  T(C): 36.5 (2023 07:16), Max: 36.5 (2023 06:40)  T(F): 97.7 (2023 07:16), Max: 97.7 (2023 06:40)  HR: 109 (2023 12:30) (57 - 112)  BP: 103/59 (2023 12:30) (55/37 - 119/91)  BP(mean): 67 (2023 12:30) (41 - 96)  RR: 17 (2023 12:30) (14 - 22)  SpO2: 100% (2023 12:30) (99% - 100%)  Parameters below as of 2023 10:30  Patient On (Oxygen Delivery Method): room air  CAPILLARY BLOOD GLUCOSE  I&O's Detail  2023 07:  -  2023 07:00  --------------------------------------------------------  IN:    Lactated Ringers: 400 mL  Total IN: 400 mL  OUT:  Total OUT: 0 m  Total NET: 400 mL  2023 07:01  -  2023 14:55  --------------------------------------------------------  IN:    Lactated Ringers: 400 mL    Lactated Ringers: 187.5 mL    Lactated Ringers Bolus: 2000 mL    Other (mL): 2000 mL    Oxytocin: 312.5 mL  Total IN: 4900 mL  OUT:    Blood Loss (mL): 363 mL    Indwelling Catheter - Urethral (mL): 540 mL  Total OUT: 903 mL  Total NET: 3997 mL-    EXAM  NEUROLOGY  Exam appears comfortable intubated and sedated  HEENT  Exam: Normocephalic, atraumatic.  EOMI   RESPIRATORY  Exam: Lungs clear to auscultation, Normal expansion/effort.    Mechanical Ventilation:   CARDIOVASCULAR  Exam: S1, S2.  Regular rate and rhythm.    GI/NUTRITION  Exam: Abdomen soft, abthera vac on suction  Current Diet:  NPO  VASCULAR  Exam: Extremities warm, pink, well-perfused.   MUSCULOSKELETAL  Exam: All extremities moving spontaneously without limitations.    SKIN:  Exam: Good skin turgor, no skin breakdown.    METABOLIC/FLUIDS/ELECTROLYTES  lactated ringers Bolus 1000 milliLiter(s) IV Bolus once  lactated ringers Bolus 500 milliLiter(s) IV Bolus once  HEMATOLOGIC  [x] DVT Prophylaxis:   Transfusions:	[] PRBC	[] Platelets		[] FFP	[] Cryoprecipitate  INFECTIOUS DISEASE  Antimicrobials/Immunologic Medications:  influenza   Vaccine 0.5 milliLiter(s) IntraMuscular once    Day #  	of    ***  Tubes/Lines/Drains  ***  [x] Peripheral IV  [] Central Venous Line     	[] R	[] L	[] IJ	[] Fem	[] SC	Date Placed:   [] Arterial Line		[] R	[] L	[] Fem	[] Rad	[] Ax	Date Placed:   [] PICC:         	[] Midline		[] Mediport  [] Urinary Catheter		Date Placed:     LAB                                          7.6                   Neurophils% (auto):   85.0   ( @ 14:15):    13.42)-----------(152          Lymphocytes% (auto):  6.1                                           22.4                   Eosinphils% (auto):   0.0    Manual%: Neutrophils x    ; Lymphocytes x    ; Eosinophils x    ; Bands%: x    ; Blasts x      (  @ 14:15 )   PT: 12.1 sec;   INR: 1.07 ratio  aPTT: 27.3 sec  ABG - ( 2023 14:36 )  pH: 7.33  /  pCO2: 32    /  pO2: 249   / HCO3: 17    / Base Excess: -8.2  /  SaO2: 99.3  / Lactate: x          OTHER LABS  IMAGING RESULTS  Echo:   CT:

## 2023-11-14 NOTE — OB POSTPARTUM EVENT NOTE - [email or web address]_[email or web address]_ALL_OB_FT
HUBERT Drake at bedside, 1 L LR bolus and Zofran IVP to be given as per PA, head of bed lowered, MD Orr called to bedside, will continue to monitor

## 2023-11-14 NOTE — BRIEF OPERATIVE NOTE - OPERATION/FINDINGS
Patient hypotensive to 70/40s after an episode of emesis in PACU s/p uncomplicated scheduled rLTCS+BS. CBC notable for H/H downtrend greater than expected for EBL from C/S. FAST scan performed, large volume free fluid in Santos's Pouch, no free fluid in posterior cul-de-sac, uterus with thin endometrial stripe. Patient taken to OR for exploratory laparotomy and evacuation of hemoperitoneum to attempt to isolate source of bleeding.     Upon laparotomy through Pfannenstiel incision, 2L hemoperitoneum expelled from peritoneal cavity. Brisk active bleeding visualized, thought to be originating from RUQ, midline vertical incision made. RUQ tamponaded with laps. LUQ examined, and notable for similar brisk bleeding with quick reaccumulation of bright red blood after evacuation of dark red clots. Hysterotomy intact, no uterine defects found. Bilateral adnexa and site of salpingectomy intact without active bleeding.     MTP called due to large volume acute blood loss  General surgery consulted to evaluate for upper abdominal source of bleeding Patient hypotensive to 70/40s after an episode of emesis in PACU s/p uncomplicated scheduled rLTCS+BS. CBC notable for H/H downtrend greater than expected for EBL from C/S. FAST scan performed, large volume free fluid in Santos's Pouch, no free fluid in posterior cul-de-sac, uterus with thin endometrial stripe. Patient taken to OR for exploratory laparotomy and evacuation of hemoperitoneum to attempt to isolate source of bleeding.     Upon laparotomy through Pfannenstiel incision, 2L hemoperitoneum expelled from peritoneal cavity. Brisk active bleeding visualized, thought to be originating from RUQ, midline vertical incision made. RUQ tamponaded with laps. LUQ examined, and notable for similar brisk bleeding with quick reaccumulation of bright red blood after evacuation of dark red clots. Hysterotomy intact, no uterine defects found. Bilateral adnexa and site of salpingectomy intact without active bleeding.     MTP called due to large volume acute blood loss  General surgery consulted to evaluate for upper abdominal source of bleeding. No definitive source of bleeding identified.

## 2023-11-14 NOTE — OB RN INTRAOPERATIVE NOTE - NSSELHIDDEN_OBGYN_ALL_OB_FT
[[email or web address]_OB_FT:[id number]],[[email or web address]_ALL_OB_FT:[id number]],[NS_DeliveryRN_OBGYN_ALL_OB_FT:[id number]]

## 2023-11-14 NOTE — OB RN PATIENT PROFILE - CAREGIVER
Received request via: Pharmacy    Was the patient seen in the last year in this department? Yes    Does the patient have an active prescription (recently filled or refills available) for medication(s) requested? No    Does the patient have [facility] and need 100 day supply (blood pressure, diabetes and cholesterol meds only)? Patient does not have SCP      Last Office Visit:02/24/2023  Last Labs:02/24/2023     Declines

## 2023-11-14 NOTE — OB PROVIDER H&P - NSHPPHYSICALEXAM_GEN_ALL_CORE
Physical Exam:  Vitals: ICU Vital Signs Last 24 Hrs  T(C): --  T(F): --  HR: 86 (14 Nov 2023 06:44) (86 - 86)  BP: 107/73 (14 Nov 2023 06:44) (107/73 - 107/73)  BP(mean): --  ABP: --  ABP(mean): --  RR: --  SpO2: --      Gen: NAD, A+O x 3, resting comfortably  Resp: CTAB  Cardio: RRR  Abd: Gravid, soft, non-distended, non-tender to superficial and deep palpation in all 4 quadrants, no rebound/guarding  Ext: Warm, well perfused, 1+ nonpitting edema bilaterally    EFM: 130 bpm moderate variability with spontaneous accelerations, no decelerations, Reactive NST  Dale: Irregular painless contractions

## 2023-11-14 NOTE — OB RN PATIENT PROFILE - IF RESULT GREATER THAN 35 DAYS OLD SELECT STATUS
Breastfeeding Discharge Instructions       Feed the baby at the earliest sign of hunger or comfort  o Hands to mouth, sucking motions  o Rooting or searching for something to suck on  o Dont wait for crying - it is a sign of distress     The feedings may be 8-12 times per 24hrs and will not follow a schedule   Avoid pacifiers and bottles for the first 4 weeks   Alternate the breast you start the feeding with, or start with the breast that feels the fullest   Switch breasts when the baby takes himself off the breast or falls asleep   Keep offering breasts until the baby looks full, no longer gives hunger signs, and stays asleep when placed on his back in the crib   If the baby is sleepy and wont wake for a feeding, put the baby skin-to-skin dressed in a diaper against the mothers bare chest   Sleep near your baby   The baby should be positioned and latched on to the breast correctly  o Chest-to-chest, chin in the breast  o Babys lips are flipped outward  o Babys mouth is stretched open wide like a shout  o Babys sucking should feel like tugging to the mother  - The baby should be drinking at the breast:  o You should hear swallowing or gulping throughout the feeding  o You should see milk on the babys lips when he comes off the breast  o Your breasts should be softer when the baby is finished feeding  o The baby should look relaxed at the end of feedings  o After the 4th day and your milk is in:  o The babys poop should turn bright yellow and be loose, watery, and seedy  o The baby should have at least 3-4 poops the size of the palm of your hand per day  o The baby should have at least 5-6 wet diapers per day  o The urine should be light yellow in color  You should drink when you are thirsty and eat a healthy diet when you are    hungry.     Take naps to get the rest you need.   Take medications and/or drink alcohol only with permission of your obstetrician    or the babys pediatrician.  You can  also call the [facility],   ([phone]), Monday-Friday, 8am-5pm Central time, to get the most   up-to-date evidence-based information on the use of medications during   pregnancy and breastfeeding.      The baby should be examined by a pediatrician at 3-5 days of age.  Once your   milk comes in, the baby should be gaining at least ½ - 1oz each day and should be back to birthweight no later than 10-14 days of age.          Community Resources    [facility] Breastfeeding Warmline: [phone]   Local [facility] clinics: provide incentives and breastpumps to eligible mothers  [employer] (LLLI):  mother-to-mother support group website        www.Boosterl.Applicasa  Local La Leche League mother-to-mother support groups:        www.Oesia        La Leche League [facility]   Dr. Zachariah Talbot website for latch videos and general information:        www.breastfeedinginc.ca  [facility] is a call center that provides information about the safety of taking medications while breastfeeding.  Call [phone], M-F, 8am-5pm, [place].  International Lactation Consultant Association provides resources for assistance:        www.ilca.org  [place] Breastfeeding Coalition provides informationand resources for parents  and the community    www.LaBreastfeedingSupport.org     Janneth Wolff is a mom-to-mom support group:                             www.nolanesting.Motiga//breastfeedng-support/  Partners for Healthy Babies:  4-202-214-BABY(2888)  [employer]: a breastfeeding support group for women of color, 330.833.3386          Breastfeeding Discharge Instructions       Feed the baby at the earliest sign of hunger or comfort  o Hands to mouth, sucking motions  o Rooting or searching for something to suck on  o Dont wait for crying - it is a sign of distress     The feedings may be 8-12 times per 24hrs and will not follow a schedule   Avoid pacifiers and bottles for the first 4  weeks   Alternate the breast you start the feeding with, or start with the breast that feels the fullest   Switch breasts when the baby takes himself off the breast or falls asleep   Keep offering breasts until the baby looks full, no longer gives hunger signs, and stays asleep when placed on his back in the crib   If the baby is sleepy and wont wake for a feeding, put the baby skin-to-skin dressed in a diaper against the mothers bare chest   Sleep near your baby   The baby should be positioned and latched on to the breast correctly  o Chest-to-chest, chin in the breast  o Babys lips are flipped outward  o Babys mouth is stretched open wide like a shout  o Babys sucking should feel like tugging to the mother  - The baby should be drinking at the breast:  o You should hear swallowing or gulping throughout the feeding  o You should see milk on the babys lips when he comes off the breast  o Your breasts should be softer when the baby is finished feeding  o The baby should look relaxed at the end of feedings  o After the 4th day and your milk is in:  o The babys poop should turn bright yellow and be loose, watery, and seedy  o The baby should have at least 3-4 poops the size of the palm of your hand per day  o The baby should have at least 5-6 wet diapers per day  o The urine should be light yellow in color  You should drink when you are thirsty and eat a healthy diet when you are    hungry.     Take naps to get the rest you need.   Take medications and/or drink alcohol only with permission of your obstetrician    or the babys pediatrician.  You can also call the [facility],   ([phone]), Monday-Friday, 8am-5pm Central time, to get the most   up-to-date evidence-based information on the use of medications during   pregnancy and breastfeeding.      The baby should be examined by a pediatrician at 3-5 days of age.  Once your   milk comes in, the baby should be gaining at least ½ - 1oz each  day and should be back to birthweight no later than 10-14 days of age.          Community Resources    [facility] Breastfeeding Warmline: [phone]   Local [facility] clinics: provide incentives and breastpumps to eligible mothers  La Leche League International (LLLI):  mother-to-mother support group website        www.lll.SBA Materials  Local La Leche League mother-to-mother support groups:        www.Yogurt3D Engine.[employer] Sol Innovations        La Leche League [facility]   Dr. Zachariah Talbot website for latch videos and general information:        www.breastfeedinginc.ca  [facility] is a call center that provides information about the safety of taking medications while breastfeeding.  Call [phone], M-F, 8am-5pm, [place].  International Lactation Consultant Association provides resources for assistance:        www.ilca.org  [place] Breastfeeding Coalition provides informationand resources for parents  and the community    www.LaBreastfeedingSupport.org     Janneth Wolff is a mom-to-mom support group:                             www.nolantwiDAQ.com//breastfeedng-support/  Partners for Healthy Babies:  9-927-834-BABY(6473)  [employer]: a breastfeeding support group for women of color, [phone]             N/A

## 2023-11-14 NOTE — BRIEF OPERATIVE NOTE - COMMENTS
Upon opening patient, hysterotomy and salpingectomy sites were  all hemostatic but large amount of bleeding coming from upper abdomen. MTP called and general surgery called and made large vertical incision to see source of bleeding Upon opening patient, hysterotomy and salpingectomy sites were  all hemostatic but large amount of bleeding coming from upper abdomen. MTP called and general surgery called and made large vertical incision to see source of bleeding    Dictation#: [id number]

## 2023-11-14 NOTE — PROVIDER CONTACT NOTE (OTHER) - ASSESSMENT
Wound vac not holding suction- alarming. foam is unable to be assessed as well because it is under the skin

## 2023-11-14 NOTE — BRIEF OPERATIVE NOTE - OPERATION/FINDINGS
Four quarter abdominal packing and exploration. Only area with slow oozing of blood was in the LUQ, but it was serosanguinous in quality. Uterus was hemostatic. No area of injury was found, to explain the hemorrhage. Decision was made to place an Abthera vac with the abdomen open and to have a CTA done. Patient sent to CT scanner and then sent to SICU.

## 2023-11-14 NOTE — CONSULT NOTE ADULT - SUBJECTIVE AND OBJECTIVE BOX
General Surgery Consult  Consulting surgical team: B Team  Consulting attending: Dr. Romulo Melara    HPI:    33-year-old female with a history of , presented for repeat  and bilateral salpingectomy today. Patient now s/p  and b/l salpingectomy earlier today, with a QBL of 363cc, UOP 350cc, received two liters of crystalloid. Two hours post-operatively, the patient developed hypotension and bleeding from her incision for which she was taken back to the OR. General surgery consulted intraoperatively for bleeding of unknown origin. At time of consult, patient's EBL was 2000cc s/p 2u pRBCs, 1u FFP. Patient hypotensive to 70 systolic, not on pressors. Perioperative FAST positive for fluid around liver.      PAST MEDICAL HISTORY:  No pertinent past medical history    PAST SURGICAL HISTORY:  Previous  section      MEDICATIONS:  influenza   Vaccine 0.5 milliLiter(s) IntraMuscular once  lactated ringers Bolus 500 milliLiter(s) IV Bolus once  lactated ringers Bolus 1000 milliLiter(s) IV Bolus once      ALLERGIES:  No Known Allergies      VITALS & I/Os:  Vital Signs Last 24 Hrs  T(C): 36.5 (2023 07:16), Max: 36.5 (2023 06:40)  T(F): 97.7 (2023 07:16), Max: 97.7 (2023 06:40)  HR: 109 (2023 12:30) (57 - 112)  BP: 103/59 (2023 12:30) (55/37 - 119/91)  BP(mean): 67 (2023 12:30) (41 - 96)  RR: 17 (2023 12:30) (14 - 22)  SpO2: 100% (2023 12:30) (99% - 100%)    Parameters below as of 2023 10:30  Patient On (Oxygen Delivery Method): room air        I&O's Summary    2023 07:01  -  2023 07:00  --------------------------------------------------------  IN: 400 mL / OUT: 0 mL / NET: 400 mL    2023 07:01  -  2023 14:41  --------------------------------------------------------  IN: 4900 mL / OUT: 903 mL / NET: 3997 mL        PHYSICAL EXAM:  Unable to examine. Patient on operative table with ex-lap underway by OBGYN team.    LABS:                        7.6    13.42 )-----------( 152      ( 2023 14:15 )             22.4           Lactate:    PT/INR - ( 2023 14:15 )   PT: 12.1 sec;   INR: 1.07 ratio         PTT - ( 2023 14:15 )  PTT:27.3 sec  ABG - ( 2023 14:13 )  pH, Arterial: 7.34  pH, Blood: x     /  pCO2: 38    /  pO2: 337   / HCO3: 20    / Base Excess: -4.8  /  SaO2: 99.8                        IMAGING:                                                                                               General Surgery Consult  Consulting surgical team: B Team  Consulting attending: Dr. Romulo Melara    HPI:    33-year-old female with a history of , presented for repeat  and bilateral salpingectomy today. Patient now s/p  and b/l salpingectomy earlier today, with a QBL of 363cc, UOP 350cc, received two liters of crystalloid. Postop retching.  Two hours post-operatively, the patient developed hypotension and bleeding from her incision for which she was taken back to the OR. General surgery consulted intraoperatively for bleeding of unknown origin. At time of consult, patient's EBL was 2000cc s/p 2u pRBCs, 1u FFP. Patient hypotensive to 70 systolic, not on pressors. Perioperative FAST positive for fluid around liver.      PAST MEDICAL HISTORY:  No pertinent past medical history    PAST SURGICAL HISTORY:  Previous  section      MEDICATIONS:  influenza   Vaccine 0.5 milliLiter(s) IntraMuscular once  lactated ringers Bolus 500 milliLiter(s) IV Bolus once  lactated ringers Bolus 1000 milliLiter(s) IV Bolus once      ALLERGIES:  No Known Allergies      VITALS & I/Os:  Vital Signs Last 24 Hrs  T(C): 36.5 (2023 07:16), Max: 36.5 (2023 06:40)  T(F): 97.7 (2023 07:16), Max: 97.7 (2023 06:40)  HR: 109 (2023 12:30) (57 - 112)  BP: 103/59 (2023 12:30) (55/37 - 119/91)  BP(mean): 67 (2023 12:30) (41 - 96)  RR: 17 (2023 12:30) (14 - 22)  SpO2: 100% (2023 12:30) (99% - 100%)    Parameters below as of 2023 10:30  Patient On (Oxygen Delivery Method): room air        I&O's Summary    2023 07:01  -  2023 07:00  --------------------------------------------------------  IN: 400 mL / OUT: 0 mL / NET: 400 mL    2023 07:01  -  2023 14:41  --------------------------------------------------------  IN: 4900 mL / OUT: 903 mL / NET: 3997 mL        PHYSICAL EXAM:  Unable to examine. Patient on operative table with ex-lap underway by OBGYN team.    LABS:                        7.6    13.42 )-----------( 152      ( 2023 14:15 )             22.4           Lactate:    PT/INR - ( 2023 14:15 )   PT: 12.1 sec;   INR: 1.07 ratio         PTT - ( 2023 14:15 )  PTT:27.3 sec  ABG - ( 2023 14:13 )  pH, Arterial: 7.34  pH, Blood: x     /  pCO2: 38    /  pO2: 337   / HCO3: 20    / Base Excess: -4.8  /  SaO2: 99.8                        IMAGING:

## 2023-11-14 NOTE — CONSULT NOTE ADULT - ASSESSMENT
33-year-old female with a history of , presented for repeat  and bilateral salpingectomy s/p  and b/l salpingectomy  with a QBL of 363cc, UOP 350cc, received two liters of crystalloid. Two hours post-operatively, the patient developed hypotension and bleeding from her incision for which she was taken back to the OR. General surgery consulted intraoperatively for bleeding of unknown origin. At time of consult, patient's EBL was 2000cc s/p 2u pRBCs, 1u FFP. Patient hypotensive to 70 systolic, not on pressors, bleeding unable to be localized and appeared to stop, abthera vac placed.    PLAN:  ***  Neurologic:   - Standing tylenol for pain    Respiratory:   -    Cardiovascular:   -     Gastrointestinal/Nutrition:   - NPO    Renal/Genitourinary:   - Continue IVF    Hematologic:   - SCDs    Infectious Disease:   -     Tubes/Lines/Drains:   -     Endocrine:   -     Disposition: SICU  --------------------------------------------------------------------------------------  Critical Care Diagnoses: 33-year-old female with a history of , presented for repeat  and bilateral salpingectomy s/p  and b/l salpingectomy  with a QBL of 363cc, UOP 350cc, received two liters of crystalloid. Two hours post-operatively, the patient developed hypotension and bleeding from her incision for which she was taken back to the OR. General surgery consulted intraoperatively for bleeding of unknown origin, bleeding unable to be localized and appeared to stop, abthera vac placed. CTa negative    PLAN:  ***  Neurologic:   - Propofol and fentanyl for sedation and pain    Respiratory:   - MVent 450/14/40%    Cardiovascular:   - Stable not on pressors    Gastrointestinal/Nutrition:   - NPO    Renal/Genitourinary:   - Continue IVF    Hematologic:   - SCDs  - EBL:  3.547  L  IV Fluids:   5.1 L   Blood Products: 5 U BRBC, 4 U FFP 1 Platelet 1 TXA 2 U Riastap    Infectious Disease:   - Zosyn    Tubes/Lines/Drains:   - L radial a line 2 PIV    Endocrine:   - Stable    Disposition: SICU  --------------------------------------------------------------------------------------  Critical Care Diagnoses: 33-year-old female with a history of , presented for repeat  and bilateral salpingectomy s/p  and b/l salpingectomy  with a QBL of 363cc, UOP 350cc, received two liters of crystalloid. Two hours post-operatively, the patient developed hypotension and bleeding from her incision for which she was taken back to the OR. General surgery consulted intraoperatively for bleeding of unknown origin, bleeding unable to be localized and appeared to stop, abthera vac placed. CTa negative    PLAN: Neurologic:   - Propofol and fentanyl for sedation and pain    Respiratory:   - MVent 450/14/40%    Cardiovascular:   - Stable not on pressors    Gastrointestinal/Nutrition:   - NPO    Renal/Genitourinary:   - Continue IVF    Hematologic:   - SCDs  - EBL:  3.547  L  IV Fluids:   5.1 L   Blood Products: 5 U BRBC, 4 U FFP 1 Platelet 1 TXA 2 U Riastap    Infectious Disease:   - Zosyn    Tubes/Lines/Drains:   - L radial a line 2 PIV    Endocrine:   - Stable    Disposition: SICU  --------------------------------------------------------------------------------------  Critical Care Diagnoses:

## 2023-11-14 NOTE — CHART NOTE - NSCHARTNOTEFT_GEN_A_CORE
OB/GYN PA NOTE    RN notified PA that patient vomiting, with noteable hypotension     Patient seen and examined at bedside. Patient at bedside, sleeping with RN Corby speaking Tongan interpreting. Patient denies lightheadedness, dizziness, fever/chills, severe abdominal pain, rash, palpitations, shortness of breath, chest pain      Vital Signs: ICU Vital Signs Last 24 Hrs  T(C): 36.5 (14 Nov 2023 07:16), Max: 36.5 (14 Nov 2023 06:40)  T(F): 97.7 (14 Nov 2023 07:16), Max: 97.7 (14 Nov 2023 06:40)  HR: 102 (14 Nov 2023 11:50) (57 - 112)  BP: 93/51 (14 Nov 2023 11:50) (55/37 - 119/91)  BP(mean): 61 (14 Nov 2023 11:50) (41 - 96)  ABP: --  ABP(mean): --  RR: 16 (14 Nov 2023 11:50) (14 - 22)  SpO2: 100% (14 Nov 2023 11:50) (99% - 100%)    O2 Parameters below as of 14 Nov 2023 10:30  Patient On (Oxygen Delivery Method): room air        BPs: 70s/40s with vomiting and immediately after    Gen: NAD, A+O x 3, resting comfortably in bedside  Resp: CTAB  Cardio: Tachycardic, regular rhythm   Dressing: C/D/I  Abd: Soft, non-distended, non-tender to palpation in all 4 quadrants, no rebound/guarding  Fundus: Firm, midline, 2 below, appropriately tender to palpation  Lochia: Minimal  Ext: Warm, well perfused bilaterally, SCDs bilaterally     UOP: adequate, appears clear    A&P: 33 year old POD 0 s/p rLTCS and bilateral salpingectomy, scheduled, uncomplicated  cc, with persistent nausea associated with hypotension and mild tachycardia, unremarkable physical exam, vital signs stable  - Administer 1 L LR bolus  - Call Dr. Orr of Anesthesia, at bedside administering her ephedrine with significant improvement in BPs  - If continues plan to send CBC/Coags STAT  - Continue to closely monitor vital signs  - Anesthesia at bedside, will appreciate recommendations  - Continue routine postpartum and post-operative care  Discussed with Dr. Bliss

## 2023-11-14 NOTE — OB PROVIDER H&P - ASSESSMENT
33 year old  @ 39.0 weeks, admitted to L&D for scheduled repeat  and bilateral salpingectomy for sterilization, with history of prior , Reactive NST, vital signs stable  - Admit to L&D  - NPO  - IV access, CBC/T&C/RPR  - Pepcid and Bicitra as per pre-op policy  - Anesthesia consult   - Consent to be discussed and obtained by Dr. Bliss  - Plan to move to OR on time schedule  - Educated and discussed with patient the assessment and plan, pt verbalized understanding and agreement with assessment and plan, all questions answered  - Discussed with Dr. Bliss

## 2023-11-14 NOTE — OB PROVIDER H&P - HISTORY OF PRESENT ILLNESS
[facility]  #[id number], Josef Murphy      33 year old  @ 39.0 weeks, RODOLFO 2023 dated by LMP (23) consistent with 1st Trimester US as per patient, presents to L&D for scheduled repeat  and bilateral salpingectomy for sterilization secondary to history of prior . Patient admits to normal fetal movement. Denies vaginal bleeding, leakage of fluid, painful contractions/lower abdominal cramping, fever/chills, shortness of breath,  chest pain, increased swelling, difficulty ambulating, loss of taste/smell, nausea/vomiting/diarrhea, rash, weakness, paresthesia, change in appetite, dizziness, lightheadedness, cough, nasal congestion, runny nose    Antepartum Course:  1. History of prior   2. Late Transfer of Care

## 2023-11-15 ENCOUNTER — TRANSCRIPTION ENCOUNTER (OUTPATIENT)
Age: 33
End: 2023-11-15

## 2023-11-15 LAB
ANION GAP SERPL CALC-SCNC: 11 MMOL/L — SIGNIFICANT CHANGE UP (ref 7–14)
ANION GAP SERPL CALC-SCNC: 11 MMOL/L — SIGNIFICANT CHANGE UP (ref 7–14)
APTT BLD: 27.1 SEC — SIGNIFICANT CHANGE UP (ref 24.5–35.6)
APTT BLD: 27.1 SEC — SIGNIFICANT CHANGE UP (ref 24.5–35.6)
BLOOD GAS ARTERIAL - LYTES,HGB,ICA,LACT RESULT: SIGNIFICANT CHANGE UP
BLOOD GAS ARTERIAL - LYTES,HGB,ICA,LACT RESULT: SIGNIFICANT CHANGE UP
BUN SERPL-MCNC: 5 MG/DL — LOW (ref 7–23)
BUN SERPL-MCNC: 5 MG/DL — LOW (ref 7–23)
CALCIUM SERPL-MCNC: 8.4 MG/DL — SIGNIFICANT CHANGE UP (ref 8.4–10.5)
CALCIUM SERPL-MCNC: 8.4 MG/DL — SIGNIFICANT CHANGE UP (ref 8.4–10.5)
CHLORIDE SERPL-SCNC: 105 MMOL/L — SIGNIFICANT CHANGE UP (ref 98–107)
CHLORIDE SERPL-SCNC: 105 MMOL/L — SIGNIFICANT CHANGE UP (ref 98–107)
CO2 SERPL-SCNC: 23 MMOL/L — SIGNIFICANT CHANGE UP (ref 22–31)
CO2 SERPL-SCNC: 23 MMOL/L — SIGNIFICANT CHANGE UP (ref 22–31)
CREAT SERPL-MCNC: 0.38 MG/DL — LOW (ref 0.5–1.3)
CREAT SERPL-MCNC: 0.38 MG/DL — LOW (ref 0.5–1.3)
EGFR: 135 ML/MIN/1.73M2 — SIGNIFICANT CHANGE UP
EGFR: 135 ML/MIN/1.73M2 — SIGNIFICANT CHANGE UP
GLUCOSE SERPL-MCNC: 105 MG/DL — HIGH (ref 70–99)
GLUCOSE SERPL-MCNC: 105 MG/DL — HIGH (ref 70–99)
HCT VFR BLD CALC: 23.4 % — LOW (ref 34.5–45)
HCT VFR BLD CALC: 23.4 % — LOW (ref 34.5–45)
HCT VFR BLD CALC: 24.8 % — LOW (ref 34.5–45)
HCT VFR BLD CALC: 24.8 % — LOW (ref 34.5–45)
HGB BLD-MCNC: 8.4 G/DL — LOW (ref 11.5–15.5)
HGB BLD-MCNC: 8.4 G/DL — LOW (ref 11.5–15.5)
HGB BLD-MCNC: 9 G/DL — LOW (ref 11.5–15.5)
HGB BLD-MCNC: 9 G/DL — LOW (ref 11.5–15.5)
INR BLD: 0.98 RATIO — SIGNIFICANT CHANGE UP (ref 0.85–1.18)
INR BLD: 0.98 RATIO — SIGNIFICANT CHANGE UP (ref 0.85–1.18)
MAGNESIUM SERPL-MCNC: 2.1 MG/DL — SIGNIFICANT CHANGE UP (ref 1.6–2.6)
MAGNESIUM SERPL-MCNC: 2.1 MG/DL — SIGNIFICANT CHANGE UP (ref 1.6–2.6)
MCHC RBC-ENTMCNC: 30.4 PG — SIGNIFICANT CHANGE UP (ref 27–34)
MCHC RBC-ENTMCNC: 30.4 PG — SIGNIFICANT CHANGE UP (ref 27–34)
MCHC RBC-ENTMCNC: 30.9 PG — SIGNIFICANT CHANGE UP (ref 27–34)
MCHC RBC-ENTMCNC: 30.9 PG — SIGNIFICANT CHANGE UP (ref 27–34)
MCHC RBC-ENTMCNC: 35.9 GM/DL — SIGNIFICANT CHANGE UP (ref 32–36)
MCHC RBC-ENTMCNC: 35.9 GM/DL — SIGNIFICANT CHANGE UP (ref 32–36)
MCHC RBC-ENTMCNC: 36.3 GM/DL — HIGH (ref 32–36)
MCHC RBC-ENTMCNC: 36.3 GM/DL — HIGH (ref 32–36)
MCV RBC AUTO: 84.8 FL — SIGNIFICANT CHANGE UP (ref 80–100)
MCV RBC AUTO: 84.8 FL — SIGNIFICANT CHANGE UP (ref 80–100)
MCV RBC AUTO: 85.2 FL — SIGNIFICANT CHANGE UP (ref 80–100)
MCV RBC AUTO: 85.2 FL — SIGNIFICANT CHANGE UP (ref 80–100)
NRBC # BLD: 0 /100 WBCS — SIGNIFICANT CHANGE UP (ref 0–0)
NRBC # FLD: 0 K/UL — SIGNIFICANT CHANGE UP (ref 0–0)
PHOSPHATE SERPL-MCNC: 4.6 MG/DL — HIGH (ref 2.5–4.5)
PHOSPHATE SERPL-MCNC: 4.6 MG/DL — HIGH (ref 2.5–4.5)
PLATELET # BLD AUTO: 111 K/UL — LOW (ref 150–400)
PLATELET # BLD AUTO: 111 K/UL — LOW (ref 150–400)
PLATELET # BLD AUTO: 114 K/UL — LOW (ref 150–400)
PLATELET # BLD AUTO: 114 K/UL — LOW (ref 150–400)
POTASSIUM SERPL-MCNC: 4.2 MMOL/L — SIGNIFICANT CHANGE UP (ref 3.5–5.3)
POTASSIUM SERPL-MCNC: 4.2 MMOL/L — SIGNIFICANT CHANGE UP (ref 3.5–5.3)
POTASSIUM SERPL-SCNC: 4.2 MMOL/L — SIGNIFICANT CHANGE UP (ref 3.5–5.3)
POTASSIUM SERPL-SCNC: 4.2 MMOL/L — SIGNIFICANT CHANGE UP (ref 3.5–5.3)
PROTHROM AB SERPL-ACNC: 11 SEC — SIGNIFICANT CHANGE UP (ref 9.5–13)
PROTHROM AB SERPL-ACNC: 11 SEC — SIGNIFICANT CHANGE UP (ref 9.5–13)
RBC # BLD: 2.76 M/UL — LOW (ref 3.8–5.2)
RBC # BLD: 2.76 M/UL — LOW (ref 3.8–5.2)
RBC # BLD: 2.91 M/UL — LOW (ref 3.8–5.2)
RBC # BLD: 2.91 M/UL — LOW (ref 3.8–5.2)
RBC # FLD: 14.2 % — SIGNIFICANT CHANGE UP (ref 10.3–14.5)
RBC # FLD: 14.2 % — SIGNIFICANT CHANGE UP (ref 10.3–14.5)
RBC # FLD: 14.4 % — SIGNIFICANT CHANGE UP (ref 10.3–14.5)
RBC # FLD: 14.4 % — SIGNIFICANT CHANGE UP (ref 10.3–14.5)
SODIUM SERPL-SCNC: 139 MMOL/L — SIGNIFICANT CHANGE UP (ref 135–145)
SODIUM SERPL-SCNC: 139 MMOL/L — SIGNIFICANT CHANGE UP (ref 135–145)
WBC # BLD: 8.36 K/UL — SIGNIFICANT CHANGE UP (ref 3.8–10.5)
WBC # BLD: 8.36 K/UL — SIGNIFICANT CHANGE UP (ref 3.8–10.5)
WBC # BLD: 8.42 K/UL — SIGNIFICANT CHANGE UP (ref 3.8–10.5)
WBC # BLD: 8.42 K/UL — SIGNIFICANT CHANGE UP (ref 3.8–10.5)
WBC # FLD AUTO: 8.36 K/UL — SIGNIFICANT CHANGE UP (ref 3.8–10.5)
WBC # FLD AUTO: 8.36 K/UL — SIGNIFICANT CHANGE UP (ref 3.8–10.5)
WBC # FLD AUTO: 8.42 K/UL — SIGNIFICANT CHANGE UP (ref 3.8–10.5)
WBC # FLD AUTO: 8.42 K/UL — SIGNIFICANT CHANGE UP (ref 3.8–10.5)

## 2023-11-15 PROCEDURE — 99024 POSTOP FOLLOW-UP VISIT: CPT

## 2023-11-15 PROCEDURE — 99232 SBSQ HOSP IP/OBS MODERATE 35: CPT

## 2023-11-15 RX ORDER — DEXMEDETOMIDINE HYDROCHLORIDE IN 0.9% SODIUM CHLORIDE 4 UG/ML
0.3 INJECTION INTRAVENOUS
Qty: 400 | Refills: 0 | Status: DISCONTINUED | OUTPATIENT
Start: 2023-11-15 | End: 2023-11-16

## 2023-11-15 RX ORDER — FUROSEMIDE 40 MG
20 TABLET ORAL ONCE
Refills: 0 | Status: COMPLETED | OUTPATIENT
Start: 2023-11-15 | End: 2023-11-15

## 2023-11-15 RX ORDER — HEPARIN SODIUM 5000 [USP'U]/ML
5000 INJECTION INTRAVENOUS; SUBCUTANEOUS EVERY 8 HOURS
Refills: 0 | Status: DISCONTINUED | OUTPATIENT
Start: 2023-11-15 | End: 2023-11-16

## 2023-11-15 RX ADMIN — SODIUM CHLORIDE 100 MILLILITER(S): 9 INJECTION, SOLUTION INTRAVENOUS at 21:15

## 2023-11-15 RX ADMIN — HEPARIN SODIUM 5000 UNIT(S): 5000 INJECTION INTRAVENOUS; SUBCUTANEOUS at 13:02

## 2023-11-15 RX ADMIN — FENTANYL CITRATE 3.72 MICROGRAM(S)/KG/HR: 50 INJECTION INTRAVENOUS at 09:07

## 2023-11-15 RX ADMIN — PIPERACILLIN AND TAZOBACTAM 25 GRAM(S): 4; .5 INJECTION, POWDER, LYOPHILIZED, FOR SOLUTION INTRAVENOUS at 13:02

## 2023-11-15 RX ADMIN — SODIUM CHLORIDE 100 MILLILITER(S): 9 INJECTION, SOLUTION INTRAVENOUS at 05:26

## 2023-11-15 RX ADMIN — SODIUM CHLORIDE 100 MILLILITER(S): 9 INJECTION, SOLUTION INTRAVENOUS at 08:09

## 2023-11-15 RX ADMIN — PIPERACILLIN AND TAZOBACTAM 25 GRAM(S): 4; .5 INJECTION, POWDER, LYOPHILIZED, FOR SOLUTION INTRAVENOUS at 22:26

## 2023-11-15 RX ADMIN — Medication 20 MILLIGRAM(S): at 15:15

## 2023-11-15 RX ADMIN — PROPOFOL 8.93 MICROGRAM(S)/KG/MIN: 10 INJECTION, EMULSION INTRAVENOUS at 21:15

## 2023-11-15 RX ADMIN — CHLORHEXIDINE GLUCONATE 15 MILLILITER(S): 213 SOLUTION TOPICAL at 05:30

## 2023-11-15 RX ADMIN — DEXMEDETOMIDINE HYDROCHLORIDE IN 0.9% SODIUM CHLORIDE 5.58 MICROGRAM(S)/KG/HR: 4 INJECTION INTRAVENOUS at 23:26

## 2023-11-15 RX ADMIN — HEPARIN SODIUM 5000 UNIT(S): 5000 INJECTION INTRAVENOUS; SUBCUTANEOUS at 22:26

## 2023-11-15 RX ADMIN — PROPOFOL 8.93 MICROGRAM(S)/KG/MIN: 10 INJECTION, EMULSION INTRAVENOUS at 08:09

## 2023-11-15 RX ADMIN — PIPERACILLIN AND TAZOBACTAM 25 GRAM(S): 4; .5 INJECTION, POWDER, LYOPHILIZED, FOR SOLUTION INTRAVENOUS at 05:30

## 2023-11-15 RX ADMIN — CHLORHEXIDINE GLUCONATE 15 MILLILITER(S): 213 SOLUTION TOPICAL at 17:38

## 2023-11-15 RX ADMIN — FENTANYL CITRATE 3.72 MICROGRAM(S)/KG/HR: 50 INJECTION INTRAVENOUS at 21:14

## 2023-11-15 NOTE — PROGRESS NOTE ADULT - SUBJECTIVE AND OBJECTIVE BOX
S: Pt seen at bedside with partner. Intubated and sedated but alert to voice and to touch. Pt nods to indicate that she has pain with palpation of the abdomen.     O:   Vital Signs Last 24 Hrs  T(C): 35.6 (14 Nov 2023 16:35), Max: 36.5 (14 Nov 2023 06:40)  T(F): 96 (14 Nov 2023 16:35), Max: 97.7 (14 Nov 2023 06:40)  HR: 60 (14 Nov 2023 23:20) (53 - 112)  BP: 103/59 (14 Nov 2023 12:30) (55/37 - 119/91)  BP(mean): 67 (14 Nov 2023 12:30) (41 - 96)  RR: 14 (14 Nov 2023 23:00) (13 - 22)  SpO2: 100% (14 Nov 2023 23:20) (99% - 100%)    Parameters below as of 14 Nov 2023 21:00  Patient On (Oxygen Delivery Method): ventilator    O2 Concentration (%): 40    Labs:  Blood type: A Positive  Rubella IgG: RPR: Negative                          8.8<L>   9.28 >-----------< 107<L>    ( 11-14 @ 21:40 )             25.5<L>                        9.5<L>   10.50 >-----------< 108<L>    ( 11-14 @ 18:31 )             27.5<L>                        7.6<L>   13.42<H> >-----------< 152    ( 11-14 @ 14:15 )             22.4<L>                        8.8<L>   15.88<H> >-----------< 227    ( 11-14 @ 11:40 )             27.6<L>                        12.0   7.89 >-----------< 227    ( 11-14 @ 06:45 )             35.8    11-14-23 @ 21:40      138  |  106  |  6<L>  ----------------------------<  116<H>  4.3   |  22  |  0.34<L>    11-14-23 @ 18:31      138  |  107  |  6<L>  ----------------------------<  123<H>  3.9   |  20<L>  |  0.31<L>        Ca    8.8      14 Nov 2023 21:40  Ca    9.2      14 Nov 2023 18:31  Phos  4.5     11-14  Phos  4.0     11-14  Mg     1.40<L>     11-14  Mg     1.30<L>     11-14            chlorhexidine 0.12% Liquid 15 milliLiter(s) Oral Mucosa every 12 hours  fentaNYL   Infusion 0.5 MICROgram(s)/kG/Hr IV Continuous <Continuous>  influenza   Vaccine 0.5 milliLiter(s) IntraMuscular once  lactated ringers. 1000 milliLiter(s) IV Continuous <Continuous>  piperacillin/tazobactam IVPB.. 3.375 Gram(s) IV Intermittent every 8 hours  propofol Infusion 20.004 MICROgram(s)/kG/Min IV Continuous <Continuous>      PE:  General: Intubated and sedated.   CV: MAP>65, not requiring pressure support. A line in place.  Abdomen: Distended with abthera wound vac in place. Pt with vertical midline and low transverse incision. Reapproximated with stables. Draining serosangious fluid. ~20cc. Tender to palpation.   Addison in place, draining yellow urine  Extremities: Diffuse edema in the upper and lower extremity. Warm to the touch with palpable pulses. SCDs in place.

## 2023-11-15 NOTE — PROGRESS NOTE ADULT - SUBJECTIVE AND OBJECTIVE BOX
HISTORY  33 year old  @ 39.0 weeks s/p planned repeat  with b/l salpingectomy EBL 363cc, UOP 350cc . Post op patient hypotensive and tachycardic, + FAST scan with free fluid in [facility], went to the OR, s/p ex-lap found to have 1L of blood in abdomen general surgery consulted for unknown source of bleeding, bleeding appeared to have stopped, abthera vac placed and patient went to CT scan for angiogram and brought to SICU for monitoring, No evidence of active bleed. Plan for RTOR for r/o bleeding and closure, either today or tomorrow.     24 HOUR EVENTS:  - awake and responsive  - PoCUS 11/15 with >3 B-lines in b/l inferior lung fields, gave 20mg Lasix IVP  - UOP~ 25 cc/h        SUBJECTIVE/ROS:  [x ] A ten-point review of systems was otherwise negative except as noted.  [x ] Due to altered mental status/intubation, subjective information were not able to be obtained from the patient. History was obtained, to the extent possible, from review of the chart and collateral sources of information.        NEURO  RASS:     GCS:     CAM ICU:  Exam: awake, alert  Meds: fentaNYL   Infusion 0.5 MICROgram(s)/kG/Hr IV Continuous <Continuous>  propofol Infusion 20.004 MICROgram(s)/kG/Min IV Continuous <Continuous>    [x] Adequacy of sedation and pain control has been assessed and adjusted      RESPIRATORY  RR: 14 (11-15-23 @ 15:00) (13 - 20)  SpO2: 100% (11-15-23 @ 15:00) (99% - 100%)  Wt(kg): --  Exam: unlabored, clear to auscultation bilaterally  Mechanical Ventilation: Mode: AC/ CMV (Assist Control/ Continuous Mandatory Ventilation), RR (machine): 14, RR (patient): 14, TV (machine): 450, FiO2: 30, PEEP: 5, ITime: 0.82, MAP: 9, PIP: 19  ABG - ( 15 Nov 2023 00:37 )  pH: 7.41  /  pCO2: 36    /  pO2: 171   / HCO3: 23    / Base Excess: -1.5  /  SaO2: 99.1    Lactate: x                [N/A] Extubation Readiness Assessed  Meds:       CARDIOVASCULAR  HR: 73 (11-15-23 @ 15:00) (53 - 99)  BP: 103/67 (11-15-23 @ 12:00) (92/52 - 103/67)  BP(mean): 79 (11-15-23 @ 12:00) (62 - 79)  ABP: 89/52 (11-15-23 @ 15:00) (86/52 - 175/95)  ABP(mean): 66 (11-15-23 @ 15:00) (64 - 121)  Wt(kg): --  CVP(cm H2O): --      Exam: regular rate and rhythm  Cardiac Rhythm: sinus  Perfusion     [x]Adequate   [ ]Inadequate  Mentation   [x]Normal       [ ]Reduced  Extremities  [x]Warm         [ ]Cool  Volume Status [ x]Hypervolemic []Euvolemic [ ]Hypovolemic  Meds:       GI/NUTRITION  Exam: soft, nondistended, midline abdominal incision with wound vac, no active leakage  Diet: NPO    GENITOURINARY  I&O's Detail     @ 07:  -  11-15 @ 07:00  --------------------------------------------------------  IN:    Cryoprecipitate: 135 mL    FentaNYL: 182 mL    Lactated Ringers: 187.5 mL    Lactated Ringers: 5500 mL    Lactated Ringers: 1400 mL    Lactated Ringers Bolus: 3000 mL    Other (mL): 2000 mL    Oxytocin: 312.5 mL    Propofol: 89.4 mL    Propofol: 78.1 mL  Total IN: 27626.5 mL    OUT:    Blood Loss (mL): 3910 mL    Indwelling Catheter - Urethral (mL): 3825 mL    VAC (Vacuum Assisted Closure) System (mL): 450 mL  Total OUT: 8185 mL    Total NET: 4699.5 mL      11-15 @ 07:  -  11-15 @ 15:53  --------------------------------------------------------  IN:    FentaNYL: 133.6 mL    Lactated Ringers: 800 mL    Propofol: 55.8 mL  Total IN: 989.4 mL    OUT:    Indwelling Catheter - Urethral (mL): 205 mL    VAC (Vacuum Assisted Closure) System (mL): 300 mL  Total OUT: 505 mL    Total NET: 484.4 mL          11-15    139  |  105  |  5<L>  ----------------------------<  105<H>  4.2   |  23  |  0.38<L>    Ca    8.4      15 Nov 2023 00:37  Phos  4.6     11-15  Mg     2.10     11-15      [ ] Addison catheter, indication: N/A  Meds: lactated ringers. 1000 milliLiter(s) IV Continuous <Continuous>        HEMATOLOGIC  Meds: heparin   Injectable 5000 Unit(s) SubCutaneous every 8 hours    [x] VTE Prophylaxis                        8.4    8.42  )-----------( 111      ( 15 Nov 2023 06:00 )             23.4     PT/INR - ( 15 Nov 2023 10:40 )   PT: 11.0 sec;   INR: 0.98 ratio         PTT - ( 15 Nov 2023 10:40 )  PTT:27.1 sec  Transfusion     [ ] PRBC   [ ] Platelets   [ ] FFP   [ ] Cryoprecipitate      INFECTIOUS DISEASES  WBC Count: 8.42 K/uL (11-15 @ 06:00)  WBC Count: 8.36 K/uL (11-15 @ 00:37)  WBC Count: 9.28 K/uL ( @ 21:40)  WBC Count: 10.50 K/uL ( @ 18:31)    RECENT CULTURES:    Meds: influenza   Vaccine 0.5 milliLiter(s) IntraMuscular once  piperacillin/tazobactam IVPB.. 3.375 Gram(s) IV Intermittent every 8 hours        ENDOCRINE  CAPILLARY BLOOD GLUCOSE        Meds:       ACCESS DEVICES:  [ x] Peripheral IV  [ x] Central Venous Line	[ ] R	[ ] L	[ ] IJ	[ ] Fem	[ ] SC	Placed:   [ x] Arterial Line		[ ] R	[ ] L	[ ] Fem	[ ] Rad	[ ] Ax	Placed:   [ ] PICC:					[ ] Mediport  [ ] Urinary Catheter, Date Placed:   [x] Necessity of urinary, arterial, and venous catheters discussed    OTHER MEDICATIONS:  chlorhexidine 0.12% Liquid 15 milliLiter(s) Oral Mucosa every 12 hours      CODE STATUS:      IMAGING:

## 2023-11-15 NOTE — PROGRESS NOTE ADULT - ASSESSMENT
Neurologic:   - Propofol and fentanyl for sedation and pain  - RAAS goal -2 to -3     Respiratory:   - MVent 450/14/5/40%    Cardiovascular:   - Stable not on pressors    Gastrointestinal/Nutrition:   - NPO  - plan for 11/15    Renal/Genitourinary:   - Continue IVF    Hematologic:   - SCDs  - EBL:  3.547  L  IV Fluids:   5.1 L   Blood Products: 5 U BRBC, 4 U FFP 1 Platelet 1 TXA 2 U Riastap    Infectious Disease:   - Zosyn    Tubes/Lines/Drains:   - L radial a line 2 PIV    Endocrine:   - Stable    Disposition: SICU  --------------------------------------------------------------------------------------  Critical Care Diagnoses:   33 year old  @ 39.0 weeks s/p planned repeat  with b/l salpingectomy EBL 363cc, UOP 350cc . Post op patient hypotensive and tachycardic, + FAST scan with free fluid in [facility], went to the OR, s/p ex-lap found to have 1L of blood in abdomen general surgery consulted for unknown source of bleeding, bleeding appeared to have stopped, abthera vac placed and patient went to CT scan for angiogram and brought to SICU for monitoring, No evidence of active bleed.  EBL:  3.547  L  IV Fluids:   5.1 L   Blood Products: 5 U BRBC, 4 U FFP 1 Platelet 1 TXA 2 U Riastap.  Plan for RTOR on 11/15 with GS and OBGYN to rule out bleeding and close abdomen.    PLAN   Neurologic:   - Propofol and fentanyl for sedation and pain  - Awake and responsive  - RASS score -1 or -2    Respiratory:   - MVent 450/14/5/40%    Cardiovascular:   - Stable not on pressors  - MAP >65    Gastrointestinal/Nutrition:   - NPO  - CTA negative for active bleed or pseudoaneurysm  - Plan for RTOR today 11/15 to reassess and close abdomen (GS, OBGYN)    Renal/Genitourinary:   - Continue IVF with LR @ 100 cc/h    Hematologic:   - DVT ppx: SCDs  - Start SC heparin for DVT ppx    Infectious Disease:   - Afebrile  - Zosyn (- )    Tubes/Lines/Drains:   - L radial a line 2 PIV    Endocrine:   - Stable    Disposition: SICU  --------------------------------------------------------------------------------------  Critical Care Diagnoses:

## 2023-11-15 NOTE — CHART NOTE - NSCHARTNOTEFT_GEN_A_CORE
Associate Chief of L & D( late entry due to clinic/ administrative responsibility)  I was physically present with the attending in the PACU when the decision to take the patient back to the OR was done due to drop in H & H patient vomiting and (+) fast scan.  In the OR agree with the attendings note- Upon entry the attending reported the hemoperitoneum-Onc was notified Dr Melara and resident scrubbed to assist with bleeding. MTP was initiated upon OR and redose of ABX with placement of Corrie and r antecubital.  Source of the bleeding was not identified and there was no further bleeding- Dr Melara suggested that she go for a CTA and then to SICU     5 PRBC  4 FFP  2 reastap  1 platelets    3547cc QBL  5500 crystalloid       Post op Intraperitoneal bleeding  disposition CTA then to SICU     Malena Boudreaux M.D., M.B.A., M.S.

## 2023-11-15 NOTE — PROGRESS NOTE ADULT - SUBJECTIVE AND OBJECTIVE BOX
B TEAM SURGERY DAILY PROGRESS NOTE    SUBJECTIVE:   Patient seen and evaluated on AM rounds. Remains intubated, awake responds to commands     OBJECTIVE:  Vital Signs Last 24 Hrs  T(C): 37.4 (15 Nov 2023 08:00), Max: 37.6 (15 Nov 2023 04:00)  T(F): 99.4 (15 Nov 2023 08:00), Max: 99.6 (15 Nov 2023 04:00)  HR: 74 (15 Nov 2023 11:) (53 - 109)  BP: 97/50 (15 Nov 2023 08:) (92/52 - 103/59)  BP(mean): 65 (15 Nov 2023 08:) (62 - 73)  RR: 14 (15 Nov 2023 11:) (13 - 17)  SpO2: 99% (15 Nov 2023 11:) (99% - 100%)    Parameters below as of 15 Nov 2023 08:00  Patient On (Oxygen Delivery Method): ventilator    O2 Concentration (%): 40    Daily     Daily Weight in k.2 (15 Nov 2023 04:00)    SAKINA:   OUT: 450 mL    OUT: 300 mL        11-14  -  11-15  --------------------------------------------------------  IN:    Other (mL) - OR FLUIDS: 2000 mL  Total IN: 2000 mL    OUT:  Total OUT: 0 mL    Total NET: 2000 mL      STANDING  chlorhexidine 0.12% Liquid 15 milliLiter(s) Oral Mucosa every 12 hours  fentaNYL   Infusion 0.5 MICROgram(s)/kG/Hr (3.72 mL/Hr) IV Continuous <Continuous>  heparin   Injectable 5000 Unit(s) SubCutaneous every 8 hours  influenza   Vaccine 0.5 milliLiter(s) IntraMuscular once  lactated ringers. 1000 milliLiter(s) (100 mL/Hr) IV Continuous <Continuous>  piperacillin/tazobactam IVPB.. 3.375 Gram(s) IV Intermittent every 8 hours  propofol Infusion 20.004 MICROgram(s)/kG/Min (8.93 mL/Hr) IV Continuous <Continuous>      Labs:  139  |  105  |  5<L>  ----------------------------<  105<H>    (11-15)  4.2   |  23  |  0.38<L>          Ca    8.4      -15  Mg    2.10  Phos  4.6<H>            Urinalysis Basic - ( 15 Nov 2023 00:37 )    Color: x / Appearance: x / SG: x / pH: x  Gluc: 105 mg/dL / Ketone: x  / Bili: x / Urobili: x   Blood: x / Protein: x / Nitrite: x   Leuk Esterase: x / RBC: x / WBC x   Sq Epi: x / Non Sq Epi: x / Bacteria: x      Urinalysis Basic - ( 15 Nov 2023 00:37 )    Color: x / Appearance: x / SG: x / pH: x  Gluc: 105 mg/dL / Ketone: x  / Bili: x / Urobili: x   Blood: x / Protein: x / Nitrite: x   Leuk Esterase: x / RBC: x / WBC x   Sq Epi: x / Non Sq Epi: x / Bacteria: x      Physical Exam:  General: NAD  Cardiovascular: appears well perfused   Respiratory: intubated  Gastrointestinal: soft, nontender, mildly distended. abthera vac in place   Extremities: FROM, warm  Neurological: awake, calm, responds to commands

## 2023-11-15 NOTE — PROGRESS NOTE ADULT - ASSESSMENT
34 yo F presents for repeat  and bilateral salpingectomy s/p  and b/l salpingectomy  with a QBL of 363cc, UOP 350cc, received two liters of crystalloid. Two hours post-operatively, the patient developed hypotension and bleeding from her incision for which she was taken back to the OR. Surgery consulted intraoperatively for bleeding of unknown origin, bleeding unable to be localized and appeared to stop, abthera vac placed. Post-op CTa negative.    Plan:   - RTOR for abdominal closure today vs. tomorrow w/ primary team pending OR availability      - No evidence of ongoing bleeding   - Remainder care per SICU/primary       B Team Surgery   [id number] 34 yo F presents for repeat  and bilateral salpingectomy s/p  and b/l salpingectomy  with a QBL of 363cc, UOP 350cc, received two liters of crystalloid. Two hours post-operatively, the patient developed hypotension and bleeding from her incision for which she was taken back to the OR. Surgery consulted intraoperatively for bleeding of unknown origin, bleeding unable to be localized and appeared to stop, abthera vac placed. Post-op CTa negative.    Plan:   - RTOR for abdominal closure today vs. tomorrow pending OR availability      - No evidence of ongoing bleeding   - Remainder care per SICU/primary       B Team Surgery   [id number]

## 2023-11-15 NOTE — PROGRESS NOTE ADULT - ASSESSMENT
33 year old  @ 39.0 weeks s/p  section with salpingectomy EBL 363cc, UOP 350cc . Post op patient hypotensive and tachycardic, + Fast, went to the OR found to have 1 L of blood in abdomen general surgery called for unknown source of bleeding, bleeding appeared to have stopped, abthera vac placed and patient went to CT scan for angiogram and brought to SICU for monitoring,  No evidence of active bleed.  EBL:  3.547  L  IV Fluids:   5.1 L   Blood Products: 5 U BRBC, 4 U FFP 1 Platelet 1 TXA 2 U Riastap        PLAN   Neurologic:   - Propofol and fentanyl for sedation and pain  - Awake and responsive  - RASS score -1 or -2    Respiratory:   - MVent 450/14/5/40%    Cardiovascular:   - Stable not on pressors  - MAP >65    Gastrointestinal/Nutrition:   - NPO  - CTA negative for active bleed or pseudoaneurysm  - Plan for RTOR today 11/15 or tomorrow to reassess and close abdomen (GS, OBGYN)    Renal/Genitourinary:   - Continue IVF with LR @ 100 cc/h    Hematologic:   - DVT ppx: SCDs, HSQ    Infectious Disease:   - Afebrile  - Zosyn (- )    Tubes/Lines/Drains:   - L radial a line 2 PIV    Endocrine:   - Stable    Disposition: SICU  --------------------------------------------------------------------------------------  Critical Care Diagnoses:          Gelacio Rhodes PGY1

## 2023-11-15 NOTE — PROGRESS NOTE ADULT - SUBJECTIVE AND OBJECTIVE BOX
SICU Daily Progress Note  =====================================================  Interval/Overnight Events:       - started CHUCKY Sloan WNL   - s/p another unit cryo   - plan for OR tomorrow    MEDICATIONS:   --------------------------------------------------------------------------------------  Neurologic Medications  fentaNYL   Infusion 0.5 MICROgram(s)/kG/Hr IV Continuous <Continuous>  propofol Infusion 20.004 MICROgram(s)/kG/Min IV Continuous <Continuous>    Respiratory Medications    Cardiovascular Medications    Gastrointestinal Medications  lactated ringers. 1000 milliLiter(s) IV Continuous <Continuous>    Genitourinary Medications    Hematologic/Oncologic Medications  influenza   Vaccine 0.5 milliLiter(s) IntraMuscular once    Antimicrobial/Immunologic Medications  piperacillin/tazobactam IVPB.. 3.375 Gram(s) IV Intermittent every 8 hours    Endocrine/Metabolic Medications    Topical/Other Medications  chlorhexidine 0.12% Liquid 15 milliLiter(s) Oral Mucosa every 12 hours    --------------------------------------------------------------------------------------    VITAL SIGNS, INS/OUTS (last 24 hours):  --------------------------------------------------------------------------------------  Vital Signs Last 24 Hrs  T(C): 35.6 (14 Nov 2023 16:35), Max: 36.5 (14 Nov 2023 06:40)  T(F): 96 (14 Nov 2023 16:35), Max: 97.7 (14 Nov 2023 06:40)  HR: 60 (14 Nov 2023 23:20) (53 - 112)  BP: 103/59 (14 Nov 2023 12:30) (55/37 - 119/91)  BP(mean): 67 (14 Nov 2023 12:30) (41 - 96)  RR: 14 (14 Nov 2023 23:00) (13 - 22)  SpO2: 100% (14 Nov 2023 23:20) (99% - 100%)    Parameters below as of 14 Nov 2023 21:00  Patient On (Oxygen Delivery Method): ventilator    O2 Concentration (%): 40  --------------------------------------------------------------------------------------    EXAM  NEUROLOGY  RASS:   	GCS:    Exam: Normal, NAD, alert, oriented x3, no focal deficits. ***    HEENT  Exam: Normocephalic, atraumatic, EOMI.  ***    RESPIRATORY  Exam: Lungs clear to auscultation, Normal expansion/effort. ***  Mechanical Ventilation: Mode: AC/ CMV (Assist Control/ Continuous Mandatory Ventilation), RR (machine): 14, TV (machine): 450, FiO2: 30, PEEP: 5, MAP: 9, PIP: 20    CARDIOVASCULAR  Exam: S1, S2.  Regular rate and rhythm.   ***    GI/NUTRITION  Exam: Abdomen soft, Non-tender, Non-distended.  ***  Wound:  ***  Current Diet:  NPO***    VASCULAR  Exam: Extremities warm, pink, well-perfused. ***    MUSCULOSKELETAL  Exam: All extremities moving spontaneously without limitations. ***    SKIN  Exam: Good skin turgor, no skin breakdown. ***    METABOLIC/FLUIDS/ELECTROLYTES  lactated ringers. 1000 milliLiter(s) IV Continuous <Continuous>      HEMATOLOGIC  [x] VTE Prophylaxis:   Transfusions:	[] PRBC	[] Platelets		[] FFP	[] Cryoprecipitate    INFECTIOUS DISEASE  Antimicrobials/Immunologic Medications:  influenza   Vaccine 0.5 milliLiter(s) IntraMuscular once  piperacillin/tazobactam IVPB.. 3.375 Gram(s) IV Intermittent every 8 hours    Day #      of     ***    Tubes/Lines/Drains  ***  [x] Peripheral IV  x[] Central Venous Line     	[] R	[] L	[] IJ	[] Fem	[] SC	Date Placed:   [x] Arterial Line		[] R	[] L	[] Fem	[] Rad	[] Ax	Date Placed:   [] PICC		[] Midline		[] Mediport  [] Urinary Catheter		Date Placed:   [x] Necessity of urinary, arterial, and venous catheters discussed    LABS  --------------------------------------------------------------------------------------    LABS:                        8.8    9.28  )-----------( 107      ( 14 Nov 2023 21:40 )             25.5     11-14    138  |  106  |  6<L>  ----------------------------<  116<H>  4.3   |  22  |  0.34<L>    Ca    8.8      14 Nov 2023 21:40  Phos  4.5     11-14  Mg     1.40     11-14      PT/INR - ( 14 Nov 2023 18:31 )   PT: 11.5 sec;   INR: 1.02 ratio         PTT - ( 14 Nov 2023 18:31 )  PTT:27.5 sec      --------------------------------------------------------------------------------------    OTHER LABORATORY:     IMAGING STUDIES:   CXR:    SICU Daily Progress Note  =====================================================  Interval/Overnight Events:       - started Zosyn, BMP Cr WNL   - s/p another unit cryo   - plan for RTOR today 11/15  - Pt awake and responsive, opens eyes to voice, able to track.  - On [facility] vent, volume A/C mode 14/450/5/40%  - Midline abd incision with wound vac, no bleeding outside the seal        MEDICATIONS:   --------------------------------------------------------------------------------------  Neurologic Medications  fentaNYL   Infusion 0.5 MICROgram(s)/kG/Hr IV Continuous <Continuous>  propofol Infusion 20.004 MICROgram(s)/kG/Min IV Continuous <Continuous>    Respiratory Medications    Cardiovascular Medications    Gastrointestinal Medications  lactated ringers. 1000 milliLiter(s) IV Continuous <Continuous>    Genitourinary Medications    Hematologic/Oncologic Medications  influenza   Vaccine 0.5 milliLiter(s) IntraMuscular once    Antimicrobial/Immunologic Medications  piperacillin/tazobactam IVPB.. 3.375 Gram(s) IV Intermittent every 8 hours    Endocrine/Metabolic Medications    Topical/Other Medications  chlorhexidine 0.12% Liquid 15 milliLiter(s) Oral Mucosa every 12 hours    --------------------------------------------------------------------------------------    VITAL SIGNS, INS/OUTS (last 24 hours):  --------------------------------------------------------------------------------------  ICU Vital Signs Last 24 Hrs  T(C): 37.8 (15 Nov 2023 12:00), Max: 37.8 (15 Nov 2023 12:00)  T(F): 100.1 (15 Nov 2023 12:00), Max: 100.1 (15 Nov 2023 12:00)  HR: 99 (15 Nov 2023 12:00) (53 - 99)  BP: 103/67 (15 Nov 2023 12:00) (92/52 - 103/67)  BP(mean): 79 (15 Nov 2023 12:00) (62 - 79)  ABP: 98/60 (15 Nov 2023 12:00) (92/51 - 175/95)  ABP(mean): 76 (15 Nov 2023 12:00) (65 - 121)  RR: 20 (15 Nov 2023 12:00) (13 - 20)  SpO2: 100% (15 Nov 2023 12:00) (99% - 100%)    O2 Parameters below as of 15 Nov 2023 12:00  Patient On (Oxygen Delivery Method): ventilator    O2 Concentration (%): 40    --------------------------------------------------------------------------------------    EXAM  NEUROLOGY  RASS: -1 or -2 	GCS:    Exam: Awake and responsive, no focal motor neurological deficits    HEENT  Exam: Normocephalic, atraumatic, EOMI.     RESPIRATORY  Exam: On [facility] ventilation, lungs clear to auscultation  Mechanical Ventilation: Mode: AC, RR (machine): 14, TV (machine): 450, FiO2: 40, PEEP: 5    CARDIOVASCULAR  Exam: S1, S2.  Regular rate and rhythm.       GI/NUTRITION  Exam: Abdomen soft, midline abdominal excision with wound vac, no active leakage outside the seal  Current Diet:  NPO    VASCULAR  Exam: Extremities warm, pink, well-perfused.     MUSCULOSKELETAL  Exam: All extremities moving spontaneously without limitations.     SKIN  Exam: Good skin turgor, no skin breakdown.     METABOLIC/FLUIDS/ELECTROLYTES  lactated ringers. 1000 milliLiter(s) IV Continuous <Continuous>      HEMATOLOGIC  [x] VTE Prophylaxis:   Transfusions:	[] PRBC	[] Platelets		[] FFP	[] Cryoprecipitate    INFECTIOUS DISEASE  Antimicrobials/Immunologic Medications:  influenza   Vaccine 0.5 milliLiter(s) IntraMuscular once  piperacillin/tazobactam IVPB.. 3.375 Gram(s) IV Intermittent every 8 hours    Day #      of     ***    Tubes/Lines/Drains  ***  [x] Peripheral IV  [x] Central Venous Line     	[] R	[] L	[] IJ	[] Fem	[] SC	Date Placed:   [x] Arterial Line		[] R	[] L	[] Fem	[] Rad	[] Ax	Date Placed:   [] PICC		[] Midline		[] Mediport  [] Urinary Catheter		Date Placed:   [x] Necessity of urinary, arterial, and venous catheters discussed    LABS  --------------------------------------------------------------------------------------    LABS:                        8.4    8.42  )-----------( 111      ( 15 Nov 2023 06:00 )             23.4     11-15    139  |  105  |  5<L>  ----------------------------<  105<H>  4.2   |  23  |  0.38<L>    Ca    8.4      15 Nov 2023 00:37  Phos  4.6     11-15  Mg     2.10     11-15        PT/INR - ( 15 Nov 2023 10:40 )   PT: 11.0 sec;   INR: 0.98 ratio         PTT - ( 15 Nov 2023 10:40 )  PTT:27.1 sec  CAPILLARY BLOOD GLUCOSE                  Urinalysis Basic - ( 15 Nov 2023 00:37 )    Color: x / Appearance: x / SG: x / pH: x  Gluc: 105 mg/dL / Ketone: x  / Bili: x / Urobili: x   Blood: x / Protein: x / Nitrite: x   Leuk Esterase: x / RBC: x / WBC x   Sq Epi: x / Non Sq Epi: x / Bacteria: x        ABG - ( 15 Nov 2023 00:37 )  pH, Arterial: 7.41  pH, Blood: x     /  pCO2: 36    /  pO2: 171   / HCO3: 23    / Base Excess: -1.5  /  SaO2: 99.1                I & O Summary:    11-14-23 @ 07:01  -  11-15-23 @ 07:00  --------------------------------------------------------  IN: 87543.5 mL / OUT: 8185 mL / NET: 4699.5 mL    11-15-23 @ 07:01  -  11-15-23 @ 12:51  --------------------------------------------------------  IN: 613.8 mL / OUT: 435 mL / NET: 178.8 mL        Microbiology:            --------------------------------------------------------------------------------------    OTHER LABORATORY:     IMAGING STUDIES:   CXR:

## 2023-11-15 NOTE — PROGRESS NOTE ADULT - SUBJECTIVE AND OBJECTIVE BOX
POST ANESTHESIA EVALUATION    33y  POSTOP DAY 1 s/p  section with s b/l salpingectomy EBL 363cc on . Post op patient was found to be hypotensive and tachycardic with + Fast.  Patient taken back to the OR and found to have 1 L of blood in abdomen without a clear source. Incision was extended to a ventral midline incision and an intra-operative consult with general surgery. MTP was called. Pt received 5u pRBC + 4u FFP + 1u of plt +2 vials of of Riastap.  Pt was transferred intubated to the SICU for monitoring overnight with plan for return to the OR today.  Pt clinically stable overnight.     MENTAL STATUS: Patient participation [  ] Awake     [  ] Arousable     [ x ] Sedated on fentantl and propofol ggt    AIRWAY PATENCY: [  ] Satisfactory  [ x ] Other: Patient Intubated     Vital Signs Last 24 Hrs  T(C): 37.6 (15 Nov 2023 04:00), Max: 37.6 (15 Nov 2023 04:00)  T(F): 99.6 (15 Nov 2023 04:00), Max: 99.6 (15 Nov 2023 04:00)  HR: 76 (15 Nov 2023 07:01) (53 - 112)  BP: 100/57 (15 Nov 2023 05:00) (55/37 - 119/91)  BP(mean): 71 (15 Nov 2023 05:00) (41 - 96)  RR: 14 (15 Nov 2023 07:00) (13 - 22)  SpO2: 100% (15 Nov 2023 07:01) (99% - 100%)    Parameters below as of 15 Nov 2023 04:00  Patient On (Oxygen Delivery Method): ventilator      I&O's Summary    2023 07:01  -  15 Nov 2023 07:00  --------------------------------------------------------  IN: 24966.5 mL / OUT: 7735 mL / NET: 5149.5 mL          HYDRATION STATUS:  [ x] SATISFACTORY   [  ] OTHER    NAUSEA/ VOMITTING:  [ x ] NONE  [  ] CONTROLLED [  ] OTHER     PAIN: [ x ] CONTROLLED WITH CURRENT REGIMEN  [  ] OTHER    [ x ] NO APPARENT ANESTHESIA COMPLICATIONS      Comments:   Plan for return to the main OR today for exploration.

## 2023-11-15 NOTE — PROGRESS NOTE ADULT - ASSESSMENT
33 year old  s/p  section with salpingectomy EBL 363cc on . Post op patient hypotensive and tachycardic, + Fast, went to the OR found to have 1 L of blood in abdomen without a found source of the bleeding. Incision was extended to a ventral midline incision and an intra-operative consult with general surgery. MTP was called. Pt received 5u pRBC + 4u FFP + 1u of plt +2u of Riastep.  Abthera vac placed and patient went to CT scan for angiogram which was negative for active bleeding. Pt was transferred intubated to the SICU for monitoring overnight with plan for RTOR today.  Pt clinically stable overnight.     Neuro: On Propofol and Fentanyl gtt.  Responsive to stimuli   Cardio:   - 5u pRBC + 4u FFP + 1u of plt +2u of Riastep  - A line in place. MAP>65, not requiring pressure support.   - H/H stable. Labs per SICU    Pulm: Intubated.   GI: NPO   :  - LR@100. Addison in place, urine adequate   - Cr. 0.34. No evidence of an HA    Gyn: Lochia wnl. Continue with counts  ID: Afebrile without leukocytosis. C/W Luther ( - )      Dispo: Add on to the OR with general surgery for exploratory laparotomy    Lita Chiu, PGY3  Spectra: [id number]

## 2023-11-15 NOTE — PROGRESS NOTE ADULT - ASSESSMENT
33 year old  s/p  section with salpingectomy EBL 363cc, UOP 350cc . Post op patient hypotensive and tachycardic, + Fast, went to the OR found to have 1 L of blood in abdomen without a found source of the bleeding. Incision was extended to a ventral midline incision and an intra-operative consult with general surgery. MTP was called. Pt received 5u pRBC + 4u FFP + 1u of plt +2u of Riastep.  Abthera vac placed and patient went to CT scan for angiogram which was negative for a bleeding. Pt was transferred intubated to the SICU for monitoring overnight with plan for RTOR tomorrow.  Pt clinically stable overnight.     Neuro: On Propofol and Fentanyl gtt.  Responsive to stimuli   Cardio:   - 5u pRBC + 4u FFP + 1u of plt +2u of Riastep  - A line in place. MAP>65, not requiring pressure support.   - Hct trend as above. H/H stable. Labs per SICU    Pulm: Intubated.   GI: NPO   :  - LR@100. Addison in place, urine adequate   - Cr. 0.34. No evidence of an HA    Gyn: Lochia wnl. Continue with counts  ID: Afebrile without leukocytosis. C/W Luther ( - )      Dispo: Add on to the OR with general surgery tomorrow for exploratory laparotomy    Pt seen at bedside with Dr. Ruthy Carter, PGY-3

## 2023-11-15 NOTE — CHART NOTE - NSCHARTNOTEFT_GEN_A_CORE
Patient seen and evaluated this AM with primary OB.     Ms. Moreno is a 32 yo  now post op day 1 s/p 2' rCD with bilateral salpingectomy. Post op course c/b hypotension and tachy, with FAST concerning for intraabdominal bleed, s/p reop with approx 1 L of blood in the abdomen. No source found on interrogation, general surgery consult called intraop and MTP activated. No source of bleeding was encountered to explain the hemorrhage. Decision made for abthera placement and interval closure. EBL from reop approx 3500, with initial rCD+BS with EBL of about 350cc. Patient appropriately resuscitated with 5 U pRBC, 4 U FFP, 2U RiaSTAP, 1U plt.     Exam:   Gen: NAD, intubated, responsive and following commands appropriately   Pulm: intubated   Abd: soft, abthera in place with minimal output   : rodas in place, decreased UOP with concentrated urine in the bag. Minimal vaginal bleeding   Ext: bilateral distal UE edematous, warm, + pulses, patient able to move all digits without issues. SCDs on LE     Labs:   H &H  and plt stable     Appreciate care per SICU team.   Plan for reop/ likely closure per OR availability   No current e/o ongoing bleeding and H&H stable. Fibrinogen not trended, last 198 yesterday, would sent with next set of labs   Dec urine output 15-25 cc / hour for the past few hours. On 100cc maintenance. Vitals stable and not tachycardic.   Further plan per SICU and surgery teams     MD KISHOR BeeM Attending

## 2023-11-15 NOTE — PATIENT PROFILE ADULT - FUNCTIONAL ASSESSMENT - BASIC MOBILITY 6.
4-calculated by average/Not able to assess (calculate score using [facility] averaging method)

## 2023-11-15 NOTE — CHART NOTE - NSCHARTNOTEFT_GEN_A_CORE
: Jonathan Hernandes MD    INDICATION:     PROCEDURE:  [X] LIMITED ECHO  [X] LIMITED CHEST  [ ] LIMITED RETROPERITONEAL  [X] LIMITED ABDOMINAL  [ ] LIMITED DVT  [ ] NEEDLE GUIDANCE VASCULAR  [ ] NEEDLE GUIDANCE THORACENTESIS  [ ] NEEDLE GUIDANCE PARACENTESIS  [ ] NEEDLE GUIDANCE PERICARDIOCENTESIS  [ ] OTHER    FINDINGS/INTERPRETATION:  Lungs: A lines predominantly bilateral ; some b lines in RLL ; no pleural effusion;  Heart: EF grossly looks normal; no hyperdynamic ventricles; no pericardial effusion  Abdomen: IVC : Greta PGY-1    INDICATION: volume status    PROCEDURE:  [X] LIMITED ECHO  [X] LIMITED CHEST  [ ] LIMITED RETROPERITONEAL  [X] LIMITED ABDOMINAL  [ ] LIMITED DVT  [ ] NEEDLE GUIDANCE VASCULAR  [ ] NEEDLE GUIDANCE THORACENTESIS  [ ] NEEDLE GUIDANCE PARACENTESIS  [ ] NEEDLE GUIDANCE PERICARDIOCENTESIS  [ ] OTHER    FINDINGS/INTERPRETATION:  Lungs: A-lines predominant in bilateral superior lung fields; +lung sliding; multiple (>3) B-lines in bilateral inferior lung fields; no pleural effusion  Heart: LVEF grossly looks normal; no segmental WMA noted; no pericardial effusion  Abdomen: IVC 1.3 cm with normal respirophasic variation : Greta PGY-1    INDICATION: volume status    PROCEDURE:  [X] LIMITED ECHO  [X] LIMITED CHEST  [ ] LIMITED RETROPERITONEAL  [X] LIMITED ABDOMINAL  [ ] LIMITED DVT  [ ] NEEDLE GUIDANCE VASCULAR  [ ] NEEDLE GUIDANCE THORACENTESIS  [ ] NEEDLE GUIDANCE PARACENTESIS  [ ] NEEDLE GUIDANCE PERICARDIOCENTESIS  [ ] OTHER    FINDINGS/INTERPRETATION:  Lungs: A-lines predominant in bilateral superior lung fields; +lung sliding; multiple (>3) B-lines in bilateral inferior lung fields; no pleural effusion  Heart: LVEF grossly looks normal; no segmental WMA noted; no pericardial effusion  Abdomen: IVC 1.3 cm with normal respirophasic variation.  Agree

## 2023-11-15 NOTE — PROGRESS NOTE ADULT - SUBJECTIVE AND OBJECTIVE BOX
S: Pt seen at bedside with partner. Intubated and sedated but alert to voice and to touch. Pt nods to indicate that she has pain with palpation of the abdomen.     O:   Vital Signs Last 24 Hrs  T(C): 36.7 (15 Nov 2023 00:00), Max: 36.7 (15 Nov 2023 00:00)  T(F): 98 (15 Nov 2023 00:00), Max: 98 (15 Nov 2023 00:00)  HR: 84 (15 Nov 2023 03:27) (53 - 112)  BP: 92/52 (15 Nov 2023 03:00) (55/37 - 119/91)  BP(mean): 65 (15 Nov 2023 03:00) (41 - 96)  RR: 14 (15 Nov 2023 03:00) (13 - 22)  SpO2: 100% (15 Nov 2023 03:27) (99% - 100%)    Parameters below as of 15 Nov 2023 00:00  Patient On (Oxygen Delivery Method): ventilator        Labs:  Blood type: A Positive  Rubella IgG: RPR: Negative                          9.0<L>   8.36 >-----------< 114<L>    ( 11-15 @ 00:37 )             24.8<L>                        8.8<L>   9.28 >-----------< 107<L>    ( 11-14 @ 21:40 )             25.5<L>                        9.5<L>   10.50 >-----------< 108<L>    ( 11-14 @ 18:31 )             27.5<L>                        7.6<L>   13.42<H> >-----------< 152    ( 11-14 @ 14:15 )             22.4<L>                        8.8<L>   15.88<H> >-----------< 227    ( 11-14 @ 11:40 )             27.6<L>                        12.0   7.89 >-----------< 227    ( 11-14 @ 06:45 )             35.8    11-15-23 @ 00:37      139  |  105  |  5<L>  ----------------------------<  105<H>  4.2   |  23  |  0.38<L>    11-14-23 @ 21:40      138  |  106  |  6<L>  ----------------------------<  116<H>  4.3   |  22  |  0.34<L>    11-14-23 @ 18:31      138  |  107  |  6<L>  ----------------------------<  123<H>  3.9   |  20<L>  |  0.31<L>        Ca    8.4      15 Nov 2023 00:37  Ca    8.8      14 Nov 2023 21:40  Ca    9.2      14 Nov 2023 18:31  Phos  4.6<H>     11-15  Phos  4.5     11-14  Phos  4.0     11-14  Mg     2.10     11-15  Mg     1.40<L>     11-14  Mg     1.30<L>     11-14        PE:  General: Intubated and sedated.   CV: MAP>65, not requiring pressure support. A line in place.  Abdomen: Distended with abthera wound vac in place. Pt with vertical midline and low transverse incision. Reapproximated with stables. Draining serosangious fluid. ~20cc. Tender to palpation.   Addison in place, draining yellow urine  Extremities: Diffuse edema in the upper and lower extremity. Warm to the touch with palpable pulses. SCDs in place.

## 2023-11-15 NOTE — PROGRESS NOTE ADULT - SUBJECTIVE AND OBJECTIVE BOX
POST ANESTHESIA EVALUATION    33y Female POSTOP DAY 1     MENTAL STATUS: Patient participation [  ] Awake     [  ] Arousable     [ x ] Sedated    AIRWAY PATENCY: [ x ] Satisfactory  [  ] Other:     Vital Signs Last 24 Hrs  T(C): 37.4 (15 Nov 2023 08:00), Max: 37.6 (15 Nov 2023 04:00)  T(F): 99.4 (15 Nov 2023 08:00), Max: 99.6 (15 Nov 2023 04:00)  HR: 74 (15 Nov 2023 11:00) (53 - 111)  BP: 97/50 (15 Nov 2023 08:00) (92/52 - 103/59)  BP(mean): 65 (15 Nov 2023 08:00) (62 - 73)  RR: 14 (15 Nov 2023 11:00) (13 - 17)  SpO2: 99% (15 Nov 2023 11:00) (99% - 100%)    Parameters below as of 15 Nov 2023 08:00  Patient On (Oxygen Delivery Method): ventilator    O2 Concentration (%): 40  I&O's Summary    14 Nov 2023 07:01  -  15 Nov 2023 07:00  --------------------------------------------------------  IN: 54484.5 mL / OUT: 8185 mL / NET: 4699.5 mL    15 Nov 2023 07:01  -  15 Nov 2023 12:10  --------------------------------------------------------  IN: 486.4 mL / OUT: 410 mL / NET: 76.4 mL          NAUSEA/ VOMITTING:  [ x ] NONE  [  ] CONTROLLED [  ] OTHER     PAIN: [  x] CONTROLLED WITH CURRENT REGIMEN  [  ] OTHER    [ x ] NO APPARENT ANESTHESIA COMPLICATIONS      Comments: Discussed with ICU team member

## 2023-11-16 ENCOUNTER — TRANSCRIPTION ENCOUNTER (OUTPATIENT)
Age: 33
End: 2023-11-16

## 2023-11-16 LAB
ANION GAP SERPL CALC-SCNC: 9 MMOL/L — SIGNIFICANT CHANGE UP (ref 7–14)
ANION GAP SERPL CALC-SCNC: 9 MMOL/L — SIGNIFICANT CHANGE UP (ref 7–14)
BASOPHILS # BLD AUTO: 0.02 K/UL — SIGNIFICANT CHANGE UP (ref 0–0.2)
BASOPHILS # BLD AUTO: 0.02 K/UL — SIGNIFICANT CHANGE UP (ref 0–0.2)
BASOPHILS NFR BLD AUTO: 0.2 % — SIGNIFICANT CHANGE UP (ref 0–2)
BASOPHILS NFR BLD AUTO: 0.2 % — SIGNIFICANT CHANGE UP (ref 0–2)
BUN SERPL-MCNC: 9 MG/DL — SIGNIFICANT CHANGE UP (ref 7–23)
BUN SERPL-MCNC: 9 MG/DL — SIGNIFICANT CHANGE UP (ref 7–23)
CALCIUM SERPL-MCNC: 7.4 MG/DL — LOW (ref 8.4–10.5)
CALCIUM SERPL-MCNC: 7.4 MG/DL — LOW (ref 8.4–10.5)
CHLORIDE SERPL-SCNC: 103 MMOL/L — SIGNIFICANT CHANGE UP (ref 98–107)
CHLORIDE SERPL-SCNC: 103 MMOL/L — SIGNIFICANT CHANGE UP (ref 98–107)
CO2 SERPL-SCNC: 23 MMOL/L — SIGNIFICANT CHANGE UP (ref 22–31)
CO2 SERPL-SCNC: 23 MMOL/L — SIGNIFICANT CHANGE UP (ref 22–31)
CREAT SERPL-MCNC: 0.53 MG/DL — SIGNIFICANT CHANGE UP (ref 0.5–1.3)
CREAT SERPL-MCNC: 0.53 MG/DL — SIGNIFICANT CHANGE UP (ref 0.5–1.3)
EGFR: 124 ML/MIN/1.73M2 — SIGNIFICANT CHANGE UP
EGFR: 124 ML/MIN/1.73M2 — SIGNIFICANT CHANGE UP
EOSINOPHIL # BLD AUTO: 0.07 K/UL — SIGNIFICANT CHANGE UP (ref 0–0.5)
EOSINOPHIL # BLD AUTO: 0.07 K/UL — SIGNIFICANT CHANGE UP (ref 0–0.5)
EOSINOPHIL NFR BLD AUTO: 0.8 % — SIGNIFICANT CHANGE UP (ref 0–6)
EOSINOPHIL NFR BLD AUTO: 0.8 % — SIGNIFICANT CHANGE UP (ref 0–6)
GLUCOSE SERPL-MCNC: 90 MG/DL — SIGNIFICANT CHANGE UP (ref 70–99)
GLUCOSE SERPL-MCNC: 90 MG/DL — SIGNIFICANT CHANGE UP (ref 70–99)
HCT VFR BLD CALC: 24.7 % — LOW (ref 34.5–45)
HCT VFR BLD CALC: 24.7 % — LOW (ref 34.5–45)
HGB BLD-MCNC: 8.5 G/DL — LOW (ref 11.5–15.5)
HGB BLD-MCNC: 8.5 G/DL — LOW (ref 11.5–15.5)
IANC: 6.57 K/UL — SIGNIFICANT CHANGE UP (ref 1.8–7.4)
IANC: 6.57 K/UL — SIGNIFICANT CHANGE UP (ref 1.8–7.4)
IMM GRANULOCYTES NFR BLD AUTO: 0.7 % — SIGNIFICANT CHANGE UP (ref 0–0.9)
IMM GRANULOCYTES NFR BLD AUTO: 0.7 % — SIGNIFICANT CHANGE UP (ref 0–0.9)
INR BLD: 0.96 RATIO — SIGNIFICANT CHANGE UP (ref 0.85–1.18)
INR BLD: 0.96 RATIO — SIGNIFICANT CHANGE UP (ref 0.85–1.18)
LYMPHOCYTES # BLD AUTO: 1.03 K/UL — SIGNIFICANT CHANGE UP (ref 1–3.3)
LYMPHOCYTES # BLD AUTO: 1.03 K/UL — SIGNIFICANT CHANGE UP (ref 1–3.3)
LYMPHOCYTES # BLD AUTO: 11.9 % — LOW (ref 13–44)
LYMPHOCYTES # BLD AUTO: 11.9 % — LOW (ref 13–44)
MAGNESIUM SERPL-MCNC: 1.7 MG/DL — SIGNIFICANT CHANGE UP (ref 1.6–2.6)
MAGNESIUM SERPL-MCNC: 1.7 MG/DL — SIGNIFICANT CHANGE UP (ref 1.6–2.6)
MCHC RBC-ENTMCNC: 30.4 PG — SIGNIFICANT CHANGE UP (ref 27–34)
MCHC RBC-ENTMCNC: 30.4 PG — SIGNIFICANT CHANGE UP (ref 27–34)
MCHC RBC-ENTMCNC: 34.4 GM/DL — SIGNIFICANT CHANGE UP (ref 32–36)
MCHC RBC-ENTMCNC: 34.4 GM/DL — SIGNIFICANT CHANGE UP (ref 32–36)
MCV RBC AUTO: 88.2 FL — SIGNIFICANT CHANGE UP (ref 80–100)
MCV RBC AUTO: 88.2 FL — SIGNIFICANT CHANGE UP (ref 80–100)
MONOCYTES # BLD AUTO: 0.94 K/UL — HIGH (ref 0–0.9)
MONOCYTES # BLD AUTO: 0.94 K/UL — HIGH (ref 0–0.9)
MONOCYTES NFR BLD AUTO: 10.8 % — SIGNIFICANT CHANGE UP (ref 2–14)
MONOCYTES NFR BLD AUTO: 10.8 % — SIGNIFICANT CHANGE UP (ref 2–14)
NEUTROPHILS # BLD AUTO: 6.57 K/UL — SIGNIFICANT CHANGE UP (ref 1.8–7.4)
NEUTROPHILS # BLD AUTO: 6.57 K/UL — SIGNIFICANT CHANGE UP (ref 1.8–7.4)
NEUTROPHILS NFR BLD AUTO: 75.6 % — SIGNIFICANT CHANGE UP (ref 43–77)
NEUTROPHILS NFR BLD AUTO: 75.6 % — SIGNIFICANT CHANGE UP (ref 43–77)
NRBC # BLD: 0 /100 WBCS — SIGNIFICANT CHANGE UP (ref 0–0)
NRBC # BLD: 0 /100 WBCS — SIGNIFICANT CHANGE UP (ref 0–0)
NRBC # FLD: 0 K/UL — SIGNIFICANT CHANGE UP (ref 0–0)
NRBC # FLD: 0 K/UL — SIGNIFICANT CHANGE UP (ref 0–0)
PHOSPHATE SERPL-MCNC: 4.8 MG/DL — HIGH (ref 2.5–4.5)
PHOSPHATE SERPL-MCNC: 4.8 MG/DL — HIGH (ref 2.5–4.5)
PLATELET # BLD AUTO: 109 K/UL — LOW (ref 150–400)
PLATELET # BLD AUTO: 109 K/UL — LOW (ref 150–400)
POTASSIUM SERPL-MCNC: 3.5 MMOL/L — SIGNIFICANT CHANGE UP (ref 3.5–5.3)
POTASSIUM SERPL-MCNC: 3.5 MMOL/L — SIGNIFICANT CHANGE UP (ref 3.5–5.3)
POTASSIUM SERPL-SCNC: 3.5 MMOL/L — SIGNIFICANT CHANGE UP (ref 3.5–5.3)
POTASSIUM SERPL-SCNC: 3.5 MMOL/L — SIGNIFICANT CHANGE UP (ref 3.5–5.3)
PROTHROM AB SERPL-ACNC: 10.8 SEC — SIGNIFICANT CHANGE UP (ref 9.5–13)
PROTHROM AB SERPL-ACNC: 10.8 SEC — SIGNIFICANT CHANGE UP (ref 9.5–13)
RBC # BLD: 2.8 M/UL — LOW (ref 3.8–5.2)
RBC # BLD: 2.8 M/UL — LOW (ref 3.8–5.2)
RBC # FLD: 14.7 % — HIGH (ref 10.3–14.5)
RBC # FLD: 14.7 % — HIGH (ref 10.3–14.5)
SODIUM SERPL-SCNC: 135 MMOL/L — SIGNIFICANT CHANGE UP (ref 135–145)
SODIUM SERPL-SCNC: 135 MMOL/L — SIGNIFICANT CHANGE UP (ref 135–145)
WBC # BLD: 8.69 K/UL — SIGNIFICANT CHANGE UP (ref 3.8–10.5)
WBC # BLD: 8.69 K/UL — SIGNIFICANT CHANGE UP (ref 3.8–10.5)
WBC # FLD AUTO: 8.69 K/UL — SIGNIFICANT CHANGE UP (ref 3.8–10.5)
WBC # FLD AUTO: 8.69 K/UL — SIGNIFICANT CHANGE UP (ref 3.8–10.5)

## 2023-11-16 PROCEDURE — 99291 CRITICAL CARE FIRST HOUR: CPT | Mod: 25

## 2023-11-16 PROCEDURE — 13160 SEC CLSR SURG WND/DEHSN XTN: CPT | Mod: 58

## 2023-11-16 PROCEDURE — 74018 RADEX ABDOMEN 1 VIEW: CPT | Mod: 26

## 2023-11-16 PROCEDURE — 49002 REOPENING OF ABDOMEN: CPT | Mod: GC

## 2023-11-16 RX ORDER — ONDANSETRON 8 MG/1
4 TABLET, FILM COATED ORAL ONCE
Refills: 0 | Status: COMPLETED | OUTPATIENT
Start: 2023-11-16 | End: 2023-11-16

## 2023-11-16 RX ORDER — SODIUM CHLORIDE 9 MG/ML
1000 INJECTION, SOLUTION INTRAVENOUS
Refills: 0 | Status: DISCONTINUED | OUTPATIENT
Start: 2023-11-16 | End: 2023-11-16

## 2023-11-16 RX ORDER — HYDROMORPHONE HYDROCHLORIDE 2 MG/ML
1 INJECTION INTRAMUSCULAR; INTRAVENOUS; SUBCUTANEOUS EVERY 4 HOURS
Refills: 0 | Status: DISCONTINUED | OUTPATIENT
Start: 2023-11-16 | End: 2023-11-16

## 2023-11-16 RX ORDER — HYDROMORPHONE HYDROCHLORIDE 2 MG/ML
0.5 INJECTION INTRAMUSCULAR; INTRAVENOUS; SUBCUTANEOUS EVERY 4 HOURS
Refills: 0 | Status: DISCONTINUED | OUTPATIENT
Start: 2023-11-16 | End: 2023-11-16

## 2023-11-16 RX ORDER — PHENYLEPHRINE HYDROCHLORIDE 10 MG/ML
0.5 INJECTION INTRAVENOUS
Qty: 160 | Refills: 0 | Status: DISCONTINUED | OUTPATIENT
Start: 2023-11-16 | End: 2023-11-16

## 2023-11-16 RX ORDER — METOCLOPRAMIDE HCL 10 MG
10 TABLET ORAL EVERY 6 HOURS
Refills: 0 | Status: DISCONTINUED | OUTPATIENT
Start: 2023-11-16 | End: 2023-11-23

## 2023-11-16 RX ORDER — ACETAMINOPHEN 500 MG
1000 TABLET ORAL EVERY 6 HOURS
Refills: 0 | Status: COMPLETED | OUTPATIENT
Start: 2023-11-16 | End: 2023-11-17

## 2023-11-16 RX ORDER — ONDANSETRON 8 MG/1
4 TABLET, FILM COATED ORAL EVERY 6 HOURS
Refills: 0 | Status: DISCONTINUED | OUTPATIENT
Start: 2023-11-16 | End: 2023-11-23

## 2023-11-16 RX ORDER — TETANUS TOXOID, REDUCED DIPHTHERIA TOXOID AND ACELLULAR PERTUSSIS VACCINE, ADSORBED 5; 2.5; 8; 8; 2.5 [IU]/.5ML; [IU]/.5ML; UG/.5ML; UG/.5ML; UG/.5ML
0.5 SUSPENSION INTRAMUSCULAR ONCE
Refills: 0 | Status: DISCONTINUED | OUTPATIENT
Start: 2023-11-16 | End: 2023-11-23

## 2023-11-16 RX ORDER — OXYCODONE HYDROCHLORIDE 5 MG/1
5 TABLET ORAL ONCE
Refills: 0 | Status: DISCONTINUED | OUTPATIENT
Start: 2023-11-16 | End: 2023-11-23

## 2023-11-16 RX ORDER — METOCLOPRAMIDE HCL 10 MG
10 TABLET ORAL ONCE
Refills: 0 | Status: COMPLETED | OUTPATIENT
Start: 2023-11-16 | End: 2023-11-16

## 2023-11-16 RX ORDER — ONDANSETRON 8 MG/1
4 TABLET, FILM COATED ORAL ONCE
Refills: 0 | Status: DISCONTINUED | OUTPATIENT
Start: 2023-11-16 | End: 2023-11-23

## 2023-11-16 RX ORDER — MAGNESIUM SULFATE 500 MG/ML
2 VIAL (ML) INJECTION ONCE
Refills: 0 | Status: COMPLETED | OUTPATIENT
Start: 2023-11-16 | End: 2023-11-16

## 2023-11-16 RX ORDER — SIMETHICONE 80 MG/1
80 TABLET, CHEWABLE ORAL EVERY 4 HOURS
Refills: 0 | Status: DISCONTINUED | OUTPATIENT
Start: 2023-11-16 | End: 2023-11-23

## 2023-11-16 RX ORDER — SODIUM CHLORIDE 9 MG/ML
1000 INJECTION, SOLUTION INTRAVENOUS
Refills: 0 | Status: DISCONTINUED | OUTPATIENT
Start: 2023-11-16 | End: 2023-11-18

## 2023-11-16 RX ORDER — OXYCODONE HYDROCHLORIDE 5 MG/1
5 TABLET ORAL
Refills: 0 | Status: DISCONTINUED | OUTPATIENT
Start: 2023-11-16 | End: 2023-11-23

## 2023-11-16 RX ORDER — LANOLIN
1 OINTMENT (GRAM) TOPICAL EVERY 6 HOURS
Refills: 0 | Status: DISCONTINUED | OUTPATIENT
Start: 2023-11-16 | End: 2023-11-23

## 2023-11-16 RX ORDER — POTASSIUM CHLORIDE 20 MEQ
10 PACKET (EA) ORAL
Refills: 0 | Status: COMPLETED | OUTPATIENT
Start: 2023-11-16 | End: 2023-11-16

## 2023-11-16 RX ORDER — ACETAMINOPHEN 500 MG
975 TABLET ORAL
Refills: 0 | Status: DISCONTINUED | OUTPATIENT
Start: 2023-11-16 | End: 2023-11-23

## 2023-11-16 RX ORDER — IBUPROFEN 200 MG
600 TABLET ORAL EVERY 6 HOURS
Refills: 0 | Status: COMPLETED | OUTPATIENT
Start: 2023-11-16 | End: 2024-10-14

## 2023-11-16 RX ORDER — MAGNESIUM HYDROXIDE 400 MG/1
30 TABLET, CHEWABLE ORAL
Refills: 0 | Status: DISCONTINUED | OUTPATIENT
Start: 2023-11-16 | End: 2023-11-23

## 2023-11-16 RX ORDER — KETOROLAC TROMETHAMINE 30 MG/ML
30 SYRINGE (ML) INJECTION EVERY 6 HOURS
Refills: 0 | Status: DISCONTINUED | OUTPATIENT
Start: 2023-11-16 | End: 2023-11-17

## 2023-11-16 RX ORDER — DIPHENHYDRAMINE HCL 50 MG
25 CAPSULE ORAL EVERY 6 HOURS
Refills: 0 | Status: DISCONTINUED | OUTPATIENT
Start: 2023-11-16 | End: 2023-11-23

## 2023-11-16 RX ORDER — PHENYLEPHRINE HYDROCHLORIDE 10 MG/ML
0.5 INJECTION INTRAVENOUS
Qty: 40 | Refills: 0 | Status: DISCONTINUED | OUTPATIENT
Start: 2023-11-16 | End: 2023-11-16

## 2023-11-16 RX ORDER — SENNA PLUS 8.6 MG/1
2 TABLET ORAL AT BEDTIME
Refills: 0 | Status: DISCONTINUED | OUTPATIENT
Start: 2023-11-16 | End: 2023-11-23

## 2023-11-16 RX ADMIN — Medication 100 MILLIEQUIVALENT(S): at 04:55

## 2023-11-16 RX ADMIN — Medication 1000 MILLIGRAM(S): at 14:30

## 2023-11-16 RX ADMIN — Medication 1000 MILLIGRAM(S): at 08:30

## 2023-11-16 RX ADMIN — PHENYLEPHRINE HYDROCHLORIDE 6.98 MICROGRAM(S)/KG/MIN: 10 INJECTION INTRAVENOUS at 07:58

## 2023-11-16 RX ADMIN — ONDANSETRON 4 MILLIGRAM(S): 8 TABLET, FILM COATED ORAL at 17:10

## 2023-11-16 RX ADMIN — PHENYLEPHRINE HYDROCHLORIDE 6.98 MICROGRAM(S)/KG/MIN: 10 INJECTION INTRAVENOUS at 04:03

## 2023-11-16 RX ADMIN — PIPERACILLIN AND TAZOBACTAM 25 GRAM(S): 4; .5 INJECTION, POWDER, LYOPHILIZED, FOR SOLUTION INTRAVENOUS at 22:51

## 2023-11-16 RX ADMIN — Medication 30 MILLIGRAM(S): at 20:56

## 2023-11-16 RX ADMIN — Medication 400 MILLIGRAM(S): at 20:54

## 2023-11-16 RX ADMIN — ONDANSETRON 4 MILLIGRAM(S): 8 TABLET, FILM COATED ORAL at 09:20

## 2023-11-16 RX ADMIN — HYDROMORPHONE HYDROCHLORIDE 1 MILLIGRAM(S): 2 INJECTION INTRAMUSCULAR; INTRAVENOUS; SUBCUTANEOUS at 08:16

## 2023-11-16 RX ADMIN — Medication 10 MILLIGRAM(S): at 21:52

## 2023-11-16 RX ADMIN — PIPERACILLIN AND TAZOBACTAM 25 GRAM(S): 4; .5 INJECTION, POWDER, LYOPHILIZED, FOR SOLUTION INTRAVENOUS at 06:07

## 2023-11-16 RX ADMIN — CHLORHEXIDINE GLUCONATE 15 MILLILITER(S): 213 SOLUTION TOPICAL at 06:07

## 2023-11-16 RX ADMIN — HYDROMORPHONE HYDROCHLORIDE 1 MILLIGRAM(S): 2 INJECTION INTRAMUSCULAR; INTRAVENOUS; SUBCUTANEOUS at 17:30

## 2023-11-16 RX ADMIN — Medication 25 GRAM(S): at 04:04

## 2023-11-16 RX ADMIN — Medication 400 MILLIGRAM(S): at 07:59

## 2023-11-16 RX ADMIN — HEPARIN SODIUM 5000 UNIT(S): 5000 INJECTION INTRAVENOUS; SUBCUTANEOUS at 13:59

## 2023-11-16 RX ADMIN — HEPARIN SODIUM 5000 UNIT(S): 5000 INJECTION INTRAVENOUS; SUBCUTANEOUS at 06:07

## 2023-11-16 RX ADMIN — Medication 100 MILLIEQUIVALENT(S): at 04:04

## 2023-11-16 RX ADMIN — PIPERACILLIN AND TAZOBACTAM 25 GRAM(S): 4; .5 INJECTION, POWDER, LYOPHILIZED, FOR SOLUTION INTRAVENOUS at 13:59

## 2023-11-16 RX ADMIN — Medication 30 MILLIGRAM(S): at 21:45

## 2023-11-16 RX ADMIN — Medication 1000 MILLIGRAM(S): at 21:45

## 2023-11-16 RX ADMIN — Medication 100 MILLIEQUIVALENT(S): at 06:07

## 2023-11-16 RX ADMIN — Medication 400 MILLIGRAM(S): at 14:00

## 2023-11-16 RX ADMIN — SODIUM CHLORIDE 100 MILLILITER(S): 9 INJECTION, SOLUTION INTRAVENOUS at 07:59

## 2023-11-16 RX ADMIN — HYDROMORPHONE HYDROCHLORIDE 1 MILLIGRAM(S): 2 INJECTION INTRAMUSCULAR; INTRAVENOUS; SUBCUTANEOUS at 07:59

## 2023-11-16 RX ADMIN — HYDROMORPHONE HYDROCHLORIDE 1 MILLIGRAM(S): 2 INJECTION INTRAMUSCULAR; INTRAVENOUS; SUBCUTANEOUS at 17:11

## 2023-11-16 NOTE — BRIEF OPERATIVE NOTE - NSICDXBRIEFPREOP_GEN_ALL_CORE_FT
PRE-OP DIAGNOSIS:  H/O  section 2023 11:02:38  Char Drake  
PRE-OP DIAGNOSIS:  H/O  section 2023 11:02:38  Char Drake  
PRE-OP DIAGNOSIS:  Hemoperitoneum 14-Nov-2023 18:53:56  Starr Bell  Postoperative hypovolemic shock 14-Nov-2023 18:54:17  Starr Bell  
PRE-OP DIAGNOSIS:  Open abdominal wall wound 16-Nov-2023 03:17:41 SURGICAL INCISION Divina Robledo  
PRE-OP DIAGNOSIS:  Open abdominal wall wound 16-Nov-2023 03:17:41 SURGICAL INCISION Divina Robledo

## 2023-11-16 NOTE — DISCHARGE NOTE OB - YES
Blood pressure is still little high.   Increase losartan HCT to 100/12.5 mg qd.     BP Readings from Last 3 Encounters:   11/29/21 140/74   06/18/21 154/76   01/27/21 168/80      
He wants to consider treatment but wants to wait until he has his colonoscopy and carpal tunnel surgery completed. He will request prescription for Lamisil at later point.   
Lab Results   Component Value Date    GLUCOSE 108 (H) 06/18/2021    GLUCOSE 107 (H) 12/18/2020    GLUCOSE 106 (H) 06/16/2020     Lab Results   Component Value Date    HGBA1C 6.20 (H) 09/17/2021    HGBA1C 6.16 (H) 06/18/2021    HGBA1C 6.30 (H) 12/18/2020        Maintain a low sugar/starch/carbohydrate diet and exercise regularly. Wt loss advised.    
Referral to Dr. Peter Meek ([facility]) per patient's preference for colonoscopy.   
Statement Selected

## 2023-11-16 NOTE — PROGRESS NOTE ADULT - SUBJECTIVE AND OBJECTIVE BOX
OB Progress Note:  Delivery, POD#2    S: 32yo POD#2 s/p RLTCS &BS , POD#0 from RTOR for closure . Patient remains intubated. NPO. Addison in place with adequate urine output.     O:   Vital Signs Last 24 Hrs  T(C): 37.3 (2023 00:31), Max: 37.8 (15 Nov 2023 12:00)  T(F): 99.1 (2023 00:31), Max: 100.1 (15 Nov 2023 12:00)  HR: 77 (2023 01:00) (68 - 99)  BP: 96/53 (2023 00:31) (93/50 - 103/67)  BP(mean): 79 (15 Nov 2023 12:00) (62 - 79)  RR: 14 (2023 01:00) (11 - 20)  SpO2: 100% (:00) (99% - 100%)    Parameters below as of 15 Nov 2023 20:00  Patient On (Oxygen Delivery Method): ventilator        Labs:  Blood type: A Positive  Rubella IgG: RPR: Negative                          8.5<L>   8.69 >-----------< 109<L>    (  @ 00:50 )             24.7<L>                        8.4<L>   8.42 >-----------< 111<L>    ( 11-15 @ 06:00 )             23.4<L>                        9.0<L>   8.36 >-----------< 114<L>    ( 11-15 @ 00:37 )             24.8<L>                        8.8<L>   9.28 >-----------< 107<L>    (  @ 21:40 )             25.5<L>                        9.5<L>   10.50 >-----------< 108<L>    (  @ 18:31 )             27.5<L>                        7.6<L>   13.42<H> >-----------< 152    (  @ 14:15 )             22.4<L>                        8.8<L>   15.88<H> >-----------< 227    (  @ 11:40 )             27.6<L>                        12.0   7.89 >-----------< 227    (  @ 06:45 )             35.8    23 @ 00:50      135  |  103  |  9   ----------------------------<  90  3.5   |  23  |  0.53    11-15-23 @ 00:37      139  |  105  |  5<L>  ----------------------------<  105<H>  4.2   |  23  |  0.38<L>    23 @ 21:40      138  |  106  |  6<L>  ----------------------------<  116<H>  4.3   |  22  |  0.34<L>    23 @ 18:31      138  |  107  |  6<L>  ----------------------------<  123<H>  3.9   |  20<L>  |  0.31<L>        Ca    7.4<L>      2023 00:50  Ca    8.4      15 Nov 2023 00:37  Ca    8.8      2023 21:40  Ca    9.2      2023 18:31  Phos  4.8<H>     11-16  Phos  4.6<H>     11-15  Phos  4.5     -  Phos  4.0     11-14  Mg     1.70     11-16  Mg     2.10     11-15  Mg     1.40<L>     11-14  Mg     1.30<L>     11-14            PE:  General: Intubated and sedated.   CV: MAP>65, not requiring pressure support. A line in place.  Abdomen: Distended with abthera wound vac in place. Pt with vertical midline and low transverse incision. Reapproximated with stables. Draining serosangious fluid. ~20cc. Tender to palpation.   Addison in place, draining yellow urine  Extremities: Diffuse edema in the upper and lower extremity. Warm to the touch with palpable pulses. SCDs in place.

## 2023-11-16 NOTE — DISCHARGE NOTE OB - PATIENT PORTAL LINK FT
You can access the FollowMyHealth Patient Portal offered by [facility] by registering at the following website: [email or web address]. By joining [facility]’s FollowMyHealth portal, you will also be able to view your health information using other applications (apps) compatible with our system.

## 2023-11-16 NOTE — DISCHARGE NOTE OB - CARE PLAN
Principal Discharge DX:	Previous  delivery, delivered  Assessment and plan of treatment:	pelvic rest x 6 weeks   1

## 2023-11-16 NOTE — PROGRESS NOTE ADULT - ASSESSMENT
33 year old  s/p  section with salpingectomy EBL 363cc, UOP 350cc . Post op patient hypotensive and tachycardic, + Fast, went to the OR found to have 1 L of blood in abdomen without a found source of the bleeding. Incision was extended to a ventral midline incision and an intra-operative consult with general surgery. MTP was called. Pt received 5u pRBC + 4u FFP + 1u of plt +2u of Riastep.  Abthera vac placed and patient went to CT scan for angiogram which was negative for a bleeding. Pt was transferred intubated to the SICU for monitoring . Patient was then taken to the OR today for exploration and closure with general surgery.       Neuro: Patient is intubated and sedated  Cardio:   - 5u pRBC + 4u FFP + 1u of plt +2u of Riastep  - A line in place. MAP>65, not requiring pressure support.   - Hct trend as above. H/H stable. Labs per SICU    Pulm: Intubated. Possible extubation later this morning   GI: NPO   :  - LR@100. Addison in place, urine adequate   - Cr. 0.34. No evidence of an HA    Gyn: Lochia wnl. Continue with counts  ID: Afebrile without leukocytosis. C/W Luther ( - )      Dispo: Continue excellent care as per SICU,     Pt seen at bedside with Dr. Rogelio Robledo, PGY4

## 2023-11-16 NOTE — BRIEF OPERATIVE NOTE - OPERATION/FINDINGS
takedown of abthera, abdominal cavity examined with no signs of bleeding, midline abdominal incision closed with running maxon suture. Pfannenstiel incision closed by gyn (see separate note).

## 2023-11-16 NOTE — BRIEF OPERATIVE NOTE - NSICDXBRIEFPROCEDURE_GEN_ALL_CORE_FT
PROCEDURES:  Exploratory laparotomy 14-Nov-2023 17:33:23  Hal Orlando  
PROCEDURES:  Exploratory laparotomy 14-Nov-2023 17:33:23  Hal Orlando  Closure, fascia, abdomen 16-Nov-2023 03:16:02  Divina Robledo  
PROCEDURES:  Closure, fascia, abdomen 16-Nov-2023 03:16:02  Divina Robledo  
PROCEDURES:  Repeat  section 2023 11:02:23  Char Drake  Repeat  section with salpingectomy 2023 11:02:29  Char Drake  
PROCEDURES:  Exploratory laparotomy 14-Nov-2023 17:33:23  Hal Orlando

## 2023-11-16 NOTE — PROGRESS NOTE ADULT - ASSESSMENT
Interval Events:   - RTOR 2am  - switched from prop to precedex for sedation 2/2 hypotension     PLAN   Neurologic:   - Precedex and fentanyl for sedation and pain  - Awake and responsive  - RASS score -1 or -2    Respiratory:   - MVent 450/14/5/40%  - PoCUS 11/15 with >3 B-lines in b/l inferior lung fields  - S/p Lasix 20 mg IVP x1 on 11/15 (low UOP)    Cardiovascular:   - Stable not on pressors  - MAP >65  - grossly normal EF on POCUS 11/15     Gastrointestinal/Nutrition:   - NPO  - CTA negative for active bleed or pseudoaneurysm  - RTOR 11/16 to reassess and close abdomen (GS, OBGYN)    Renal/Genitourinary:   - Continue IVF with LR @ 100 cc/h  - S/p Lasix 20 mg IVP x1 on 11/15 for low UOP  - continue to monitor UOP     Hematologic:   - DVT ppx: SCDs  - Started SQH     Infectious Disease:   - Afebrile  - Zosyn (11/14- )    Tubes/Lines/Drains:   - L radial a line 2 PIV    Endocrine:   - Stable    Disposition: SICU  --------------------------------------------------------------------------------------  Critical Care Diagnoses:

## 2023-11-16 NOTE — DISCHARGE NOTE OB - HOSPITAL COURSE
Patient had uncomplicated repeat  section with salpingectomy and 2-3 hours postop became hypotensive with drop in hct and was found to have blood in upper quadrants. Was taken back to OR where was explored with general surgery since hysterotomy and salpingectomy sites were dry and bleeding appeared to be coming from upper abdomen. Patient had apthera placed and was in ICU after CTA was performed with negative findings. Patient had closure the following day with surgery and gyn.  Patient had MTP and received 5 units PRBC< 4 units FFP Patient had uncomplicated repeat  section with salpingectomy and 2-3 hours postop became hypotensive with drop in hct and was found to have blood in upper quadrants. Was taken back to OR where was explored with general surgery since hysterotomy and salpingectomy sites were dry and bleeding appeared to be coming from upper abdomen. Patient had apthera placed and was in ICU after CTA was performed with negative findings. Patient had closure the following day with surgery and gyn.  Postop course included an ileus which improved.  Patient had MTP and received 5 units PRBC< 4 units FFP

## 2023-11-16 NOTE — DIETITIAN INITIAL EVALUATION ADULT - PERTINENT LABORATORY DATA
11-16    135  |  103  |  9   ----------------------------<  90  3.5   |  23  |  0.53    Ca    7.4<L>      16 Nov 2023 00:50  Phos  4.8     11-16  Mg     1.70     11-16

## 2023-11-16 NOTE — BRIEF OPERATIVE NOTE - ANTIBIOTIC PROTOCOL
Ancef, redosed due to elevated EBL/Followed protocol
Followed protocol

## 2023-11-16 NOTE — CHART NOTE - NSCHARTNOTEFT_GEN_A_CORE
Pt seen at bedside for 300cc of dark green emesis. Translation assistance with family members at beside.      Brief background: 34yo POD#2 s/p RLTCS &BS complicated by return to OR with an EBL of 3500. Pt is currently POD#0 from return to OR for abthera removal and abdomen closure. Pt was successfully extubated today and weaned off of pressure support and oxygen therapy. Pt was downgraded from the SICU to the postpartum floor this evening.     S: Pt reports that she has been nauseated off and on throughout the day. Pt reports that nausea is responsive to IV Zofran. Most recently with emesis of 300cc of dark green fluid. Pt reports that after receiving additional dose of zofran pt reports no additional sensations of nausea. Pt still feels tired and sluggish. Has not gotten out of bed or trailed PO. Has not passed flatus or had a bowel movement.     O: Pt alert and oriented x4. Responsive to line of questioning   Diffuse edema, improved from days prior but noted in face as well as upper and lower extremities. Vulvar swelling consistent with days prior. Non pitting.   Abdomen is soft, mildly distended, improved from days prior. Appropriately tender to palpation. Not tense. No rebound or guarding. + Bowel sounds in all 4 quadrants - normal   Incision: Midline vertical and low transverse incision covered with clean white bandage.   Addison in place, draining yellow urine. Urine output adequate ~150cc/ hour       A/P: Patient with post operative ileus  - N/V responsive to IV antiemetics. CTM amount and frequency. IV Zofran q6 hours. IV reglan available PRN for breakthrough symptoms   - NPO   - LR@75   - Addison in place. Urine output adequate. CTM   - Discussed the importance of ambulation in the setting of an ileus. Pt desires to rest overnight, reports that she will make sure to ambulate in the AM.     DW: Dr. Melchor Carter, PGY-3

## 2023-11-16 NOTE — DIETITIAN INITIAL EVALUATION ADULT - PERTINENT MEDS FT
MEDICATIONS  (STANDING):  acetaminophen   IVPB .. 1000 milliGRAM(s) IV Intermittent every 6 hours  chlorhexidine 0.12% Liquid 15 milliLiter(s) Oral Mucosa every 12 hours  dexMEDEtomidine Infusion 0.3 MICROgram(s)/kG/Hr (5.58 mL/Hr) IV Continuous <Continuous>  heparin   Injectable 5000 Unit(s) SubCutaneous every 8 hours  influenza   Vaccine 0.5 milliLiter(s) IntraMuscular once  lactated ringers. 1000 milliLiter(s) (100 mL/Hr) IV Continuous <Continuous>  ondansetron Injectable 4 milliGRAM(s) IV Push once  phenylephrine    Infusion 0.5 MICROgram(s)/kG/Min (6.98 mL/Hr) IV Continuous <Continuous>  piperacillin/tazobactam IVPB.. 3.375 Gram(s) IV Intermittent every 8 hours    MEDICATIONS  (PRN):  HYDROmorphone  Injectable 0.5 milliGRAM(s) IV Push every 4 hours PRN Moderate Pain (4 - 6)  HYDROmorphone  Injectable 1 milliGRAM(s) IV Push every 4 hours PRN Severe Pain (7 - 10)

## 2023-11-16 NOTE — PROGRESS NOTE ADULT - SUBJECTIVE AND OBJECTIVE BOX
B Team General Surgery Progress Note    S: Pt seen and examined with team on morning rounds. Patient intubated. Arousable and able to follow commands. Appears comfortable.        Vital Signs Last 24 Hrs  T(C): 36.4 (16 Nov 2023 08:00), Max: 37.7 (15 Nov 2023 16:00)  T(F): 97.5 (16 Nov 2023 08:00), Max: 99.9 (15 Nov 2023 16:00)  HR: 72 (16 Nov 2023 11:34) (55 - 97)  BP: 97/51 (16 Nov 2023 08:00) (96/53 - 97/51)  BP(mean): 65 (16 Nov 2023 08:00) (65 - 65)  RR: 12 (16 Nov 2023 10:00) (0 - 25)  SpO2: 100% (16 Nov 2023 11:34) (100% - 100%)    Parameters below as of 16 Nov 2023 10:00  Patient On (Oxygen Delivery Method): nasal cannula  O2 Flow (L/min): 2      I&O's Summary    15 Nov 2023 07:01  -  16 Nov 2023 07:00  --------------------------------------------------------  IN: 3123.3 mL / OUT: 3010 mL / NET: 113.3 mL    16 Nov 2023 07:01  -  16 Nov 2023 12:07  --------------------------------------------------------  IN: 416.8 mL / OUT: 90 mL / NET: 326.8 mL      I&O's Detail    15 Nov 2023 07:01  -  16 Nov 2023 07:00  --------------------------------------------------------  IN:    Dexmedetomidine: 39.2 mL    FentaNYL: 429.6 mL    IV PiggyBack: 200 mL    Lactated Ringers: 2300 mL    Phenylephrine: 32.2 mL    Propofol: 122.3 mL  Total IN: 3123.3 mL    OUT:    Indwelling Catheter - Urethral (mL): 1860 mL    VAC (Vacuum Assisted Closure) System (mL): 1150 mL  Total OUT: 3010 mL    Total NET: 113.3 mL      16 Nov 2023 07:01  -  16 Nov 2023 12:07  --------------------------------------------------------  IN:    Dexmedetomidine: 11.2 mL    IV PiggyBack: 100 mL    Lactated Ringers: 300 mL    Phenylephrine: 5.6 mL  Total IN: 416.8 mL    OUT:    Indwelling Catheter - Urethral (mL): 90 mL  Total OUT: 90 mL    Total NET: 326.8 mL          General Appearance: Appears comfortable  Chest: Intubated  CV: S1, S2 @ 72  Abdomen: Softly distended, appropriate incisional tenderness, midline dressing clean/dry/intact. No surrounding erythema/drainage.  Extremities: Moves all extremities.    MEDICATIONS  (STANDING):  acetaminophen   IVPB .. 1000 milliGRAM(s) IV Intermittent every 6 hours  chlorhexidine 0.12% Liquid 15 milliLiter(s) Oral Mucosa every 12 hours  dexMEDEtomidine Infusion 0.3 MICROgram(s)/kG/Hr (5.58 mL/Hr) IV Continuous <Continuous>  heparin   Injectable 5000 Unit(s) SubCutaneous every 8 hours  influenza   Vaccine 0.5 milliLiter(s) IntraMuscular once  lactated ringers. 1000 milliLiter(s) (100 mL/Hr) IV Continuous <Continuous>  phenylephrine    Infusion 0.5 MICROgram(s)/kG/Min (6.98 mL/Hr) IV Continuous <Continuous>  piperacillin/tazobactam IVPB.. 3.375 Gram(s) IV Intermittent every 8 hours    MEDICATIONS  (PRN):  HYDROmorphone  Injectable 0.5 milliGRAM(s) IV Push every 4 hours PRN Moderate Pain (4 - 6)  HYDROmorphone  Injectable 1 milliGRAM(s) IV Push every 4 hours PRN Severe Pain (7 - 10)      LABS:                        8.5    8.69  )-----------( 109      ( 16 Nov 2023 00:50 )             24.7     11-16    135  |  103  |  9   ----------------------------<  90  3.5   |  23  |  0.53    Ca    7.4<L>      16 Nov 2023 00:50  Phos  4.8     11-16  Mg     1.70     11-16      PT/INR - ( 16 Nov 2023 00:50 )   PT: 10.8 sec;   INR: 0.96 ratio         PTT - ( 15 Nov 2023 10:40 )  PTT:27.1 sec  Urinalysis Basic - ( 16 Nov 2023 00:50 )    Color: x / Appearance: x / SG: x / pH: x  Gluc: 90 mg/dL / Ketone: x  / Bili: x / Urobili: x   Blood: x / Protein: x / Nitrite: x   Leuk Esterase: x / RBC: x / WBC x   Sq Epi: x / Non Sq Epi: x / Bacteria: x

## 2023-11-16 NOTE — CHART NOTE - NSCHARTNOTESELECT_GEN_ALL_CORE
Event Note
MFM Progress Note/Event Note
POCUS/Event Note
Emesis
Hypotensive Episode/Event Note
Transfer

## 2023-11-16 NOTE — BRIEF OPERATIVE NOTE - NSICDXBRIEFPOSTOP_GEN_ALL_CORE_FT
POST-OP DIAGNOSIS:  Open abdominal wall wound 16-Nov-2023 03:18:03 SURGICAL INCISION Divina Robledo  
POST-OP DIAGNOSIS:  S/P  section 2023 11:03:02  Char Drake  
POST-OP DIAGNOSIS:  S/P  section 2023 11:03:02  Char Drake  
POST-OP DIAGNOSIS:  Hemoperitoneum 14-Nov-2023 18:54:37  Starr Bell  Postoperative hypovolemic shock 14-Nov-2023 18:54:47  Starr Bell  
POST-OP DIAGNOSIS:  Open abdominal wall wound 16-Nov-2023 03:18:03 SURGICAL INCISION Divina Robledo

## 2023-11-16 NOTE — BRIEF OPERATIVE NOTE - OPERATION/FINDINGS
Midline vertical incision open, clean dry and intact   Pfannenstiel incision open clean dry and intact   No active bleeding noted

## 2023-11-16 NOTE — DISCHARGE NOTE OB - MEDICATION SUMMARY - MEDICATIONS TO TAKE
I will START or STAY ON the medications listed below when I get home from the hospital:    ibuprofen 600 mg oral tablet  -- 1 tab(s) by mouth every 6 hours  -- Indication: For Postpartum pain    acetaminophen 325 mg oral tablet  -- 3 tab(s) by mouth every 6 hours as needed for  mild pain  -- Indication: For Postpartum pain

## 2023-11-16 NOTE — DISCHARGE NOTE OB - CARE PROVIDER_API CALL
Nannette Bliss  Obstetrics and Gynecology  [address] [place] [address]  Phone: [phone]  Fax: [phone]  Follow Up Time:

## 2023-11-16 NOTE — PROVIDER CONTACT NOTE (OTHER) - ASSESSMENT
Patient A&Ox4, VS stabel. Patient c/o blurry vision post extubation. patient able to see colors, correctly identify number of fingers peripherally and denied headache or pain. Patient A&Ox4, VS stable. Patient c/o blurry vision post extubation. patient able to see colors, correctly identify number of fingers peripherally and denied headache or pain.

## 2023-11-16 NOTE — PROGRESS NOTE ADULT - SUBJECTIVE AND OBJECTIVE BOX
SICU Daily Progress Note  =====================================================  Interval/Overnight Events:       - weaned propofol, started precedex for hypotension  - returning to OR for abdominal closure      Allergies: No Known Allergies      MEDICATIONS:   --------------------------------------------------------------------------------------  Neurologic Medications  dexMEDEtomidine Infusion 0.3 MICROgram(s)/kG/Hr IV Continuous <Continuous>  fentaNYL   Infusion 0.5 MICROgram(s)/kG/Hr IV Continuous <Continuous>  propofol Infusion 20.004 MICROgram(s)/kG/Min IV Continuous <Continuous>    Respiratory Medications    Cardiovascular Medications    Gastrointestinal Medications  lactated ringers. 1000 milliLiter(s) IV Continuous <Continuous>    Genitourinary Medications    Hematologic/Oncologic Medications  heparin   Injectable 5000 Unit(s) SubCutaneous every 8 hours  influenza   Vaccine 0.5 milliLiter(s) IntraMuscular once    Antimicrobial/Immunologic Medications  piperacillin/tazobactam IVPB.. 3.375 Gram(s) IV Intermittent every 8 hours    Endocrine/Metabolic Medications    Topical/Other Medications  chlorhexidine 0.12% Liquid 15 milliLiter(s) Oral Mucosa every 12 hours    --------------------------------------------------------------------------------------    VITAL SIGNS, INS/OUTS (last 24 hours):  --------------------------------------------------------------------------------------  Vital Signs Last 24 Hrs  T(C): 36.9 (15 Nov 2023 20:00), Max: 37.8 (15 Nov 2023 12:00)  T(F): 98.4 (15 Nov 2023 20:00), Max: 100.1 (15 Nov 2023 12:00)  HR: 74 (15 Nov 2023 23:51) (68 - 99)  BP: 103/67 (15 Nov 2023 12:00) (92/52 - 103/67)  BP(mean): 79 (15 Nov 2023 12:00) (62 - 79)  RR: 14 (15 Nov 2023 23:00) (12 - 20)  SpO2: 100% (15 Nov 2023 23:51) (99% - 100%)    Parameters below as of 15 Nov 2023 20:00  Patient On (Oxygen Delivery Method): ventilator      --------------------------------------------------------------------------------------    EXAM  NEUROLOGY  RASS: -1 or -2 	GCS:    Exam: Awake and responsive, no focal motor neurological deficits    HEENT  Exam: Normocephalic, atraumatic, EOMI.     RESPIRATORY  Exam[facility] lungs clear to auscultation  Mechanical Ventilation: Mode: AC, RR (machine): 14, TV (machine): 450, FiO2: 40, PEEP: 5    CARDIOVASCULAR  Exam: S1, S2.  Regular rate and rhythm.       GI/NUTRITION  Exam: Abdomen soft, midline abdominal excision with wound vac, no active leakage outside the seal  Current Diet:  NPO    VASCULAR  Exam: Extremities warm, pink, well-perfused.     MUSCULOSKELETAL  Exam: All extremities moving spontaneously without limitations.     SKIN  Exam: Good skin turgor, no skin breakdown.     METABOLIC/FLUIDS/ELECTROLYTES  lactated ringers. 1000 milliLiter(s) IV Continuous <Continuous>        METABOLIC/FLUIDS/ELECTROLYTES  lactated ringers. 1000 milliLiter(s) IV Continuous <Continuous>      HEMATOLOGIC  [x] VTE Prophylaxis: heparin   Injectable 5000 Unit(s) SubCutaneous every 8 hours    Transfusions:	[] PRBC	[] Platelets		[] FFP	[] Cryoprecipitate    INFECTIOUS DISEASE  Antimicrobials/Immunologic Medications:  influenza   Vaccine 0.5 milliLiter(s) IntraMuscular once  piperacillin/tazobactam IVPB.. 3.375 Gram(s) IV Intermittent every 8 hours    Day #      of     ***    Tubes/Lines/Drains  ***  [x] Peripheral IV  [] Central Venous Line     	[] R	[] L	[] IJ	[] Fem	[] SC	Date Placed:   [] Arterial Line		[] R	[] L	[] Fem	[] Rad	[] Ax	Date Placed:   [] PICC		[] Midline		[] Mediport  [] Urinary Catheter		Date Placed:   [x] Necessity of urinary, arterial, and venous catheters discussed    LABS  --------------------------------------------------------------------------------------    LABS:                        8.4    8.42  )-----------( 111      ( 15 Nov 2023 06:00 )             23.4     11-15    139  |  105  |  5<L>  ----------------------------<  105<H>  4.2   |  23  |  0.38<L>    Ca    8.4      15 Nov 2023 00:37  Phos  4.6     11-15  Mg     2.10     11-15      PT/INR - ( 15 Nov 2023 10:40 )   PT: 11.0 sec;   INR: 0.98 ratio         PTT - ( 15 Nov 2023 10:40 )  PTT:27.1 sec      --------------------------------------------------------------------------------------    OTHER LABORATORY:     IMAGING STUDIES:   CXR:

## 2023-11-16 NOTE — DISCHARGE NOTE OB - NS MD DC FALL RISK RISK
For information on Fall & Injury Prevention, visit: [email or web address] OR  [email or web address] OR  https://www.cdc.gov/steadi/patient.html

## 2023-11-16 NOTE — PROGRESS NOTE ADULT - SUBJECTIVE AND OBJECTIVE BOX
ANESTHESIA POSTOP CHECK    33y Female POSTOP DAY 1 S/P   [x ] General Anesthesia  [ ] Jean Marie Anesthesia  [ ] MAC    Vital Signs Last 24 Hrs  T(C): 36.5 (16 Nov 2023 04:00), Max: 37.8 (15 Nov 2023 12:00)  T(F): 97.7 (16 Nov 2023 04:00), Max: 100.1 (15 Nov 2023 12:00)  HR: 87 (16 Nov 2023 09:07) (55 - 99)  BP: 96/53 (16 Nov 2023 00:31) (96/53 - 103/67)  BP(mean): 79 (15 Nov 2023 12:00) (79 - 79)  RR: 19 (16 Nov 2023 09:07) (11 - 20)  SpO2: 100% (16 Nov 2023 09:07) (99% - 100%)    Parameters below as of 16 Nov 2023 09:07  Patient On (Oxygen Delivery Method): face tent  O2 Flow (L/min): 8  O2 Concentration (%): 40  I&O's Summary    15 Nov 2023 07:01  -  16 Nov 2023 07:00  --------------------------------------------------------  IN: 3123.3 mL / OUT: 3010 mL / NET: 113.3 mL        [x ] NO APPARENT ANESTHESIA COMPLICATIONS      Comments:

## 2023-11-16 NOTE — CHART NOTE - NSCHARTNOTEFT_GEN_A_CORE
Patient is stable and now eligible for the floor, to be moved to room 511A. Verbally signed out to Dr. Chiu PGY3 regarding transfer of care to OB/GYN. All questions answered.     Gelacio Rhodes PGY1

## 2023-11-16 NOTE — PROGRESS NOTE ADULT - ASSESSMENT
33 year old  s/p  section with salpingectomy EBL 363cc, UOP 350cc . Post op patient hypotensive and tachycardic, + Fast, went to the OR found to have 1 L of blood in abdomen without a found source of the bleeding. Incision was extended to a ventral midline incision and an intra-operative consult with general surgery. MTP was called. Pt received 5u pRBC + 4u FFP + 1u of plt +2u of Riastep.  Abthera vac placed and patient went to CT scan for angiogram which was negative for a bleeding. Pt was transferred intubated to the SICU for monitoring . Patient was then taken to the OR 23 for exploration and closure with general surgery.     Plan:  - Patient stable from B Team General Surgery Perspective  - Wean to Extubate per SICU  - Diet per primary team (OB/GYN) once extubated  - Pain Control  - HD monitoring  - Wean pressors as tolerated  - Monitor labs, H/H  - OOB Ambulate once extubated  - Monitor GI fxn  - DVT prophylaxis per Ob Gyn  - Appreciate excellent SICU care  - Will sign off at this time. Please page [phone] with any questions or concerns. Please reconsult as needed.    B Team Surgery  [phone]

## 2023-11-16 NOTE — DIETITIAN INITIAL EVALUATION ADULT - OTHER INFO
34 y/o Mandarin speaking female s/p  with b/l salpingectomy. Post op patient hypotensive and tachycardic, found to have 1L of blood in abdomen. General surgery consulted for unknown source of bleeding, bleeding appeared to have stopped; abthera vac placed and patient went to CT scan for angiogram and brought to SICU for monitoring. No evidence of active bleed. Now s/p RTOR for closure early this morning. Pt recently extubated to face tent. She remains NPO with IVF and is groggy as she continues to receive precedex. Unable to interview for nutrition information/hx at this time. Plan to advance diet when medically appropriate. Please consult this service once nutrition plan of care has been established so that appropriate nutrition intervention can be provided in a timely manner. Education deferred at this time as patient is medically acute in the critical care setting. NKFA listed. RDN services to remain available as needed.

## 2023-11-16 NOTE — DISCHARGE NOTE OB - ADDITIONAL INSTRUCTIONS
The patient met all appropriate post partum milestones.  The patient was able to void, tolerated a regular diet, and ambulated on her own.  The patient was discharged on PPD#8 afebrile, with stable vital signs, and appropriate pain control. The patient met all appropriate post partum milestones.  The patient was able to void, tolerated a regular diet, and ambulated on her own.  The patient was discharged afebrile, with stable vital signs, and appropriate pain control.    Follow up for staple removal on 11/29

## 2023-11-17 LAB
ALBUMIN SERPL ELPH-MCNC: 2 G/DL — LOW (ref 3.3–5)
ALBUMIN SERPL ELPH-MCNC: 2 G/DL — LOW (ref 3.3–5)
ALP SERPL-CCNC: 98 U/L — SIGNIFICANT CHANGE UP (ref 40–120)
ALP SERPL-CCNC: 98 U/L — SIGNIFICANT CHANGE UP (ref 40–120)
ALT FLD-CCNC: 15 U/L — SIGNIFICANT CHANGE UP (ref 4–33)
ALT FLD-CCNC: 15 U/L — SIGNIFICANT CHANGE UP (ref 4–33)
ANION GAP SERPL CALC-SCNC: 13 MMOL/L — SIGNIFICANT CHANGE UP (ref 7–14)
ANION GAP SERPL CALC-SCNC: 13 MMOL/L — SIGNIFICANT CHANGE UP (ref 7–14)
APTT BLD: 34.2 SEC — SIGNIFICANT CHANGE UP (ref 24.5–35.6)
APTT BLD: 34.2 SEC — SIGNIFICANT CHANGE UP (ref 24.5–35.6)
AST SERPL-CCNC: 20 U/L — SIGNIFICANT CHANGE UP (ref 4–32)
AST SERPL-CCNC: 20 U/L — SIGNIFICANT CHANGE UP (ref 4–32)
BASOPHILS # BLD AUTO: 0.02 K/UL — SIGNIFICANT CHANGE UP (ref 0–0.2)
BASOPHILS # BLD AUTO: 0.02 K/UL — SIGNIFICANT CHANGE UP (ref 0–0.2)
BASOPHILS NFR BLD AUTO: 0.2 % — SIGNIFICANT CHANGE UP (ref 0–2)
BASOPHILS NFR BLD AUTO: 0.2 % — SIGNIFICANT CHANGE UP (ref 0–2)
BILIRUB SERPL-MCNC: 0.4 MG/DL — SIGNIFICANT CHANGE UP (ref 0.2–1.2)
BILIRUB SERPL-MCNC: 0.4 MG/DL — SIGNIFICANT CHANGE UP (ref 0.2–1.2)
BUN SERPL-MCNC: 8 MG/DL — SIGNIFICANT CHANGE UP (ref 7–23)
BUN SERPL-MCNC: 8 MG/DL — SIGNIFICANT CHANGE UP (ref 7–23)
CALCIUM SERPL-MCNC: 7.9 MG/DL — LOW (ref 8.4–10.5)
CALCIUM SERPL-MCNC: 7.9 MG/DL — LOW (ref 8.4–10.5)
CHLORIDE SERPL-SCNC: 106 MMOL/L — SIGNIFICANT CHANGE UP (ref 98–107)
CHLORIDE SERPL-SCNC: 106 MMOL/L — SIGNIFICANT CHANGE UP (ref 98–107)
CO2 SERPL-SCNC: 20 MMOL/L — LOW (ref 22–31)
CO2 SERPL-SCNC: 20 MMOL/L — LOW (ref 22–31)
CREAT SERPL-MCNC: 0.49 MG/DL — LOW (ref 0.5–1.3)
CREAT SERPL-MCNC: 0.49 MG/DL — LOW (ref 0.5–1.3)
EGFR: 127 ML/MIN/1.73M2 — SIGNIFICANT CHANGE UP
EGFR: 127 ML/MIN/1.73M2 — SIGNIFICANT CHANGE UP
EOSINOPHIL # BLD AUTO: 0.11 K/UL — SIGNIFICANT CHANGE UP (ref 0–0.5)
EOSINOPHIL # BLD AUTO: 0.11 K/UL — SIGNIFICANT CHANGE UP (ref 0–0.5)
EOSINOPHIL NFR BLD AUTO: 1.3 % — SIGNIFICANT CHANGE UP (ref 0–6)
EOSINOPHIL NFR BLD AUTO: 1.3 % — SIGNIFICANT CHANGE UP (ref 0–6)
GLUCOSE SERPL-MCNC: 82 MG/DL — SIGNIFICANT CHANGE UP (ref 70–99)
GLUCOSE SERPL-MCNC: 82 MG/DL — SIGNIFICANT CHANGE UP (ref 70–99)
HCT VFR BLD CALC: 27.5 % — LOW (ref 34.5–45)
HCT VFR BLD CALC: 27.5 % — LOW (ref 34.5–45)
HGB BLD-MCNC: 9.2 G/DL — LOW (ref 11.5–15.5)
HGB BLD-MCNC: 9.2 G/DL — LOW (ref 11.5–15.5)
IANC: 6.63 K/UL — SIGNIFICANT CHANGE UP (ref 1.8–7.4)
IANC: 6.63 K/UL — SIGNIFICANT CHANGE UP (ref 1.8–7.4)
IMM GRANULOCYTES NFR BLD AUTO: 0.4 % — SIGNIFICANT CHANGE UP (ref 0–0.9)
IMM GRANULOCYTES NFR BLD AUTO: 0.4 % — SIGNIFICANT CHANGE UP (ref 0–0.9)
INR BLD: 0.92 RATIO — SIGNIFICANT CHANGE UP (ref 0.85–1.18)
INR BLD: 0.92 RATIO — SIGNIFICANT CHANGE UP (ref 0.85–1.18)
LYMPHOCYTES # BLD AUTO: 0.86 K/UL — LOW (ref 1–3.3)
LYMPHOCYTES # BLD AUTO: 0.86 K/UL — LOW (ref 1–3.3)
LYMPHOCYTES # BLD AUTO: 10.3 % — LOW (ref 13–44)
LYMPHOCYTES # BLD AUTO: 10.3 % — LOW (ref 13–44)
MAGNESIUM SERPL-MCNC: 1.9 MG/DL — SIGNIFICANT CHANGE UP (ref 1.6–2.6)
MAGNESIUM SERPL-MCNC: 1.9 MG/DL — SIGNIFICANT CHANGE UP (ref 1.6–2.6)
MCHC RBC-ENTMCNC: 30.6 PG — SIGNIFICANT CHANGE UP (ref 27–34)
MCHC RBC-ENTMCNC: 30.6 PG — SIGNIFICANT CHANGE UP (ref 27–34)
MCHC RBC-ENTMCNC: 33.5 GM/DL — SIGNIFICANT CHANGE UP (ref 32–36)
MCHC RBC-ENTMCNC: 33.5 GM/DL — SIGNIFICANT CHANGE UP (ref 32–36)
MCV RBC AUTO: 91.4 FL — SIGNIFICANT CHANGE UP (ref 80–100)
MCV RBC AUTO: 91.4 FL — SIGNIFICANT CHANGE UP (ref 80–100)
MONOCYTES # BLD AUTO: 0.71 K/UL — SIGNIFICANT CHANGE UP (ref 0–0.9)
MONOCYTES # BLD AUTO: 0.71 K/UL — SIGNIFICANT CHANGE UP (ref 0–0.9)
MONOCYTES NFR BLD AUTO: 8.5 % — SIGNIFICANT CHANGE UP (ref 2–14)
MONOCYTES NFR BLD AUTO: 8.5 % — SIGNIFICANT CHANGE UP (ref 2–14)
NEUTROPHILS # BLD AUTO: 6.63 K/UL — SIGNIFICANT CHANGE UP (ref 1.8–7.4)
NEUTROPHILS # BLD AUTO: 6.63 K/UL — SIGNIFICANT CHANGE UP (ref 1.8–7.4)
NEUTROPHILS NFR BLD AUTO: 79.3 % — HIGH (ref 43–77)
NEUTROPHILS NFR BLD AUTO: 79.3 % — HIGH (ref 43–77)
NRBC # BLD: 0 /100 WBCS — SIGNIFICANT CHANGE UP (ref 0–0)
NRBC # BLD: 0 /100 WBCS — SIGNIFICANT CHANGE UP (ref 0–0)
NRBC # FLD: 0 K/UL — SIGNIFICANT CHANGE UP (ref 0–0)
NRBC # FLD: 0 K/UL — SIGNIFICANT CHANGE UP (ref 0–0)
PHOSPHATE SERPL-MCNC: 3.9 MG/DL — SIGNIFICANT CHANGE UP (ref 2.5–4.5)
PHOSPHATE SERPL-MCNC: 3.9 MG/DL — SIGNIFICANT CHANGE UP (ref 2.5–4.5)
PLATELET # BLD AUTO: 145 K/UL — LOW (ref 150–400)
PLATELET # BLD AUTO: 145 K/UL — LOW (ref 150–400)
POTASSIUM SERPL-MCNC: 4 MMOL/L — SIGNIFICANT CHANGE UP (ref 3.5–5.3)
POTASSIUM SERPL-MCNC: 4 MMOL/L — SIGNIFICANT CHANGE UP (ref 3.5–5.3)
POTASSIUM SERPL-SCNC: 4 MMOL/L — SIGNIFICANT CHANGE UP (ref 3.5–5.3)
POTASSIUM SERPL-SCNC: 4 MMOL/L — SIGNIFICANT CHANGE UP (ref 3.5–5.3)
PROT SERPL-MCNC: 4.8 G/DL — LOW (ref 6–8.3)
PROT SERPL-MCNC: 4.8 G/DL — LOW (ref 6–8.3)
PROTHROM AB SERPL-ACNC: 10.3 SEC — SIGNIFICANT CHANGE UP (ref 9.5–13)
PROTHROM AB SERPL-ACNC: 10.3 SEC — SIGNIFICANT CHANGE UP (ref 9.5–13)
RBC # BLD: 3.01 M/UL — LOW (ref 3.8–5.2)
RBC # BLD: 3.01 M/UL — LOW (ref 3.8–5.2)
RBC # FLD: 14.3 % — SIGNIFICANT CHANGE UP (ref 10.3–14.5)
RBC # FLD: 14.3 % — SIGNIFICANT CHANGE UP (ref 10.3–14.5)
SODIUM SERPL-SCNC: 139 MMOL/L — SIGNIFICANT CHANGE UP (ref 135–145)
SODIUM SERPL-SCNC: 139 MMOL/L — SIGNIFICANT CHANGE UP (ref 135–145)
WBC # BLD: 8.36 K/UL — SIGNIFICANT CHANGE UP (ref 3.8–10.5)
WBC # BLD: 8.36 K/UL — SIGNIFICANT CHANGE UP (ref 3.8–10.5)
WBC # FLD AUTO: 8.36 K/UL — SIGNIFICANT CHANGE UP (ref 3.8–10.5)
WBC # FLD AUTO: 8.36 K/UL — SIGNIFICANT CHANGE UP (ref 3.8–10.5)

## 2023-11-17 RX ORDER — SODIUM CHLORIDE 9 MG/ML
1000 INJECTION, SOLUTION INTRAVENOUS ONCE
Refills: 0 | Status: DISCONTINUED | OUTPATIENT
Start: 2023-11-17 | End: 2023-11-23

## 2023-11-17 RX ORDER — HEPARIN SODIUM 5000 [USP'U]/ML
5000 INJECTION INTRAVENOUS; SUBCUTANEOUS EVERY 12 HOURS
Refills: 0 | Status: DISCONTINUED | OUTPATIENT
Start: 2023-11-17 | End: 2023-11-23

## 2023-11-17 RX ORDER — SODIUM CHLORIDE 9 MG/ML
1000 INJECTION, SOLUTION INTRAVENOUS ONCE
Refills: 0 | Status: COMPLETED | OUTPATIENT
Start: 2023-11-17 | End: 2023-11-17

## 2023-11-17 RX ORDER — IBUPROFEN 200 MG
600 TABLET ORAL EVERY 6 HOURS
Refills: 0 | Status: DISCONTINUED | OUTPATIENT
Start: 2023-11-17 | End: 2023-11-23

## 2023-11-17 RX ADMIN — Medication 400 MILLIGRAM(S): at 07:57

## 2023-11-17 RX ADMIN — ONDANSETRON 4 MILLIGRAM(S): 8 TABLET, FILM COATED ORAL at 01:01

## 2023-11-17 RX ADMIN — Medication 975 MILLIGRAM(S): at 21:16

## 2023-11-17 RX ADMIN — Medication 30 MILLIGRAM(S): at 03:21

## 2023-11-17 RX ADMIN — Medication 1000 MILLIGRAM(S): at 08:40

## 2023-11-17 RX ADMIN — Medication 975 MILLIGRAM(S): at 14:03

## 2023-11-17 RX ADMIN — SODIUM CHLORIDE 75 MILLILITER(S): 9 INJECTION, SOLUTION INTRAVENOUS at 11:00

## 2023-11-17 RX ADMIN — ONDANSETRON 4 MILLIGRAM(S): 8 TABLET, FILM COATED ORAL at 06:13

## 2023-11-17 RX ADMIN — Medication 30 MILLIGRAM(S): at 18:01

## 2023-11-17 RX ADMIN — SODIUM CHLORIDE 1000 MILLILITER(S): 9 INJECTION, SOLUTION INTRAVENOUS at 09:15

## 2023-11-17 RX ADMIN — ONDANSETRON 4 MILLIGRAM(S): 8 TABLET, FILM COATED ORAL at 23:23

## 2023-11-17 RX ADMIN — Medication 975 MILLIGRAM(S): at 14:45

## 2023-11-17 RX ADMIN — Medication 30 MILLIGRAM(S): at 04:00

## 2023-11-17 RX ADMIN — PIPERACILLIN AND TAZOBACTAM 25 GRAM(S): 4; .5 INJECTION, POWDER, LYOPHILIZED, FOR SOLUTION INTRAVENOUS at 06:13

## 2023-11-17 RX ADMIN — Medication 30 MILLIGRAM(S): at 11:00

## 2023-11-17 RX ADMIN — HEPARIN SODIUM 5000 UNIT(S): 5000 INJECTION INTRAVENOUS; SUBCUTANEOUS at 01:00

## 2023-11-17 RX ADMIN — HEPARIN SODIUM 5000 UNIT(S): 5000 INJECTION INTRAVENOUS; SUBCUTANEOUS at 14:03

## 2023-11-17 RX ADMIN — Medication 30 MILLIGRAM(S): at 10:39

## 2023-11-17 RX ADMIN — Medication 975 MILLIGRAM(S): at 21:45

## 2023-11-17 RX ADMIN — Medication 30 MILLIGRAM(S): at 17:15

## 2023-11-17 NOTE — PROVIDER CONTACT NOTE (OTHER) - ASSESSMENT
AOx4, decreased mobility due to pain and weakness. Incision noted vertically and lower transverse covered with tegaderm and gauze, clean, dry and intact. AOx4, decreased mobility due to pain and weakness. Abdominal incision noted vertically and lower transverse covered with tegaderm and gauze, clean, dry and intact. Lochia scant.

## 2023-11-17 NOTE — PROGRESS NOTE ADULT - ASSESSMENT
33 year old  s/p  section with salpingectomy EBL 363cc, UOP 350cc . Post op patient hypotensive and tachycardic, + Fast, went to the OR found to have 1 L of blood in abdomen without a found source of the bleeding. Incision was extended to a ventral midline incision and an intra-operative consult with general surgery. MTP was called. Pt received 5u pRBC + 4u FFP + 1u of plt +2u of Riastep.  Abthera vac placed and patient went to CT scan for angiogram which was negative for a bleeding. Pt was transferred intubated to the SICU for monitoring. Patient now s/p RTOR  for exploration and closure. Patient stepped down from SICU overnight. Post operative course complicated by presumed ileus.     Neuro: Pain well controlled on current regimen   Cardio:   - 5u pRBC + 4u FFP + 1u of plt +2u of Riastep  - s/p A-line   - Hct stable   Pulm: s/p eTT, saturating well on RA   GI: NPO, consider advancing to CLD as tolerated   :  - LR@75. Rodas in place, urine adequate, consider dc rodas today   - Cr. 0.34. No evidence of an HA    Gyn: Lochia wnl. Continue with counts  ID: Afebrile without leukocytosis. C/W Luther ( - )      Lita Chiu, PGY3

## 2023-11-17 NOTE — PROVIDER CONTACT NOTE (OTHER) - RECOMMENDATIONS
Chief complaint:   Chief Complaint   Patient presents with   • UTI     Hematuria on and off for the last 5 days       Vitals:  Visit Vitals   • /86   • Pulse 75   • Temp 98.2 °F (36.8 °C) (Tympanic)   • Resp 16   • Ht 5' 3\" (1.6 m)   • Wt 51.3 kg   • LMP 01/02/2017   • SpO2 100%   • BMI 20.02 kg/m2       HISTORY OF PRESENT ILLNESS     Hematuria   This is a new problem. The current episode started in the past 7 days (started 5 days ago). The problem has been waxing and waning since onset. She describes the hematuria as gross hematuria. The hematuria occurs throughout her entire urinary stream. She reports clotting at the beginning of her urine stream. She is experiencing no pain. She describes her urine color as light pink. Irritative symptoms do not include frequency, nocturia or urgency. Obstructive symptoms do not include dribbling, incomplete emptying, a slower stream, straining or a weak stream. Pertinent negatives include no abdominal pain, chills, dysuria, fever, flank pain, nausea or vomiting. She is sexually active. There is no history of kidney stones, recent infection, sickle cell disease or STDs.       Other significant problems:  Patient Active Problem List    Diagnosis Date Noted   • Tobacco abuse 06/08/2013     Priority: Low       PAST MEDICAL, FAMILY AND SOCIAL HISTORY     Medications:  Current Outpatient Prescriptions   Medication   • PARoxetine (PAXIL) 10 MG tablet   • chloroquine (ARALEN) 500 MG tablet   • ALPRAZolam (XANAX) 0.25 MG tablet     No current facility-administered medications for this visit.        Allergies:  ALLERGIES:   Allergen Reactions   • Bactrim RASH       Past Medical  History/Surgeries:  Past Medical History   Diagnosis Date   • Anxiety    • Depression        No past surgical history on file.    Family History:  Family History   Problem Relation Age of Onset   • Psychiatry Father      depression       Social History:  Social History   Substance Use Topics   • Smoking 
status: Current Every Day Smoker     Packs/day: 0.50     Years: 15.00     Types: Cigarettes   • Smokeless tobacco: Not on file   • Alcohol use Yes       REVIEW OF SYSTEMS     Review of Systems   Constitutional: Negative for chills and fever.   Gastrointestinal: Negative for abdominal pain, nausea and vomiting.   Genitourinary: Negative for dysuria, flank pain, frequency, incomplete emptying, nocturia and urgency.       PHYSICAL EXAM     Physical Exam   Constitutional: She appears well-developed and well-nourished.  Non-toxic appearance. She does not have a sickly appearance. She does not appear ill. No distress.   Neck: Normal range of motion. Neck supple.   Cardiovascular: Normal rate and regular rhythm.    Pulmonary/Chest: Effort normal and breath sounds normal.   Abdominal: Soft. Bowel sounds are normal. She exhibits no distension. There is no hepatosplenomegaly. There is no tenderness. There is no rebound, no guarding, no CVA tenderness, no tenderness at McBurney's point and negative Miguel's sign.   Skin: She is not diaphoretic.   Nursing note and vitals reviewed.    CT Urogram done on 1/24     Kidneys are of normal size, configuration, cortical thickness and  enhancement. No focal cortical mass lesions, intrarenal calculi,  perinephric fluid, hydronephrosis. Ureters are of normal caliber throughout  the course of the urinary bladder which is of normal size and contour with  no intraluminal filling defects or wall thickening.        IMPRESSION:     Enlarged bulky lobular appearing uterus suggestive of diffuse fibroid  uterus with findings also suggesting free fluid in the cul-de-sac.  Correlation with pelvic ultrasound is advised to better evaluate disease.     The urinary tract is unremarkable.  ASSESSMENT/PLAN     Painless hematuria unknown cause     Urology consultation    The patient  express(es) understanding that this is the initial diagnosis and in agreement with treatment and discharge plan, 
appropriately stable at time of discharge from walk-in clinic.   Often times this diagnosis can change. The provisional diagnosis that the patient is discharged with today was based on the history taken, presenting symptoms, physical exam, and/or any ancillary testing. If new symptoms occur or worsen, patient should seek immediate medical attention for re-evaluation. Follow up with Primary Care Provider as discussed in the after visit summary.  See the AVS (patient discharge instructions) section for additional instructions, follow-up plans and/or ER (emergency room) precautions discussed with the patient.  Karla was seen today for uti.    Diagnoses and all orders for this visit:    Hematuria  -     Urinalysis with Micro  -     Pregnancy Test Urine  -     CBC & Auto Differential  -     Comprehensive Metabolic Panel  -     SERVICE TO UROLOGY  -     Cancel: CT Abdomen Pelvis  -     Cancel: CT Urogram    Painless hematuria  -     CBC & Auto Differential  -     Comprehensive Metabolic Panel  -     Urine Micro with POC Macro (Dipstick) Results  -     SERVICE TO UROLOGY  -     Cancel: CT Abdomen Pelvis  -     CT Urogram    Other orders  -     Cancel: CT ABDOMEN PELVIS FOR KIDNEY STONES            
change wound vac dressing. wound vac cannister and vac changed.
Notify MD
Awaiting orders and to be evaluated by OB Resident

## 2023-11-17 NOTE — PROVIDER CONTACT NOTE (OTHER) - SITUATION
Patient came right from OR Abthera vac not holding suction
patient c/o blurry vision post extubation. patient able to see colors, correctly identify number of fingers peripherally and denied headache or pain.
Pt c/o nausea and had an emesis of dark green fluids 300cc. She verbalized she still has blurry vision but has improved. Also noted left labial swelling - no discoloration and soft on palpation

## 2023-11-17 NOTE — PROVIDER CONTACT NOTE (CHANGE IN STATUS NOTIFICATION) - ASSESSMENT
AOx4, noting facial edema and upper extremities. Left labial swelling. Incision abdomen clean and covered with gauze and tegaderm, scant lochia throughout shift.

## 2023-11-17 NOTE — PROGRESS NOTE ADULT - SUBJECTIVE AND OBJECTIVE BOX
34yo POD#3 s/p rLTCS & BS and POD#1 s/p RTOR for closure. Overnight, episode of emesis. Nausea improved with zofran. Pain better controlled, still NPO given nausea. Addison in place. Denies heavy vaginal bleeding, CP/SOB, lightheadedness/dizziness, headache, visual disturbances, epigastric pain.     O:  Vitals:  Vital Signs Last 24 Hrs  T(C): 36.8 (17 Nov 2023 07:00), Max: 36.8 (17 Nov 2023 07:00)  T(F): 98.2 (17 Nov 2023 07:00), Max: 98.2 (17 Nov 2023 07:00)  HR: 69 (17 Nov 2023 07:00) (64 - 97)  BP: 113/60 (17 Nov 2023 07:00) (96/55 - 113/60)  BP(mean): 66 (16 Nov 2023 18:00) (65 - 68)  RR: 16 (17 Nov 2023 07:00) (0 - 25)  SpO2: 97% (17 Nov 2023 07:00) (97% - 100%)    Parameters below as of 17 Nov 2023 07:00  Patient On (Oxygen Delivery Method): room air        MEDICATIONS  (STANDING):  acetaminophen     Tablet .. 975 milliGRAM(s) Oral <User Schedule>  acetaminophen   IVPB .. 1000 milliGRAM(s) IV Intermittent every 6 hours  diphtheria/tetanus/pertussis (acellular) Vaccine (Adacel) 0.5 milliLiter(s) IntraMuscular once  heparin   Injectable 5000 Unit(s) SubCutaneous every 12 hours  ibuprofen  Tablet. 600 milliGRAM(s) Oral every 6 hours  influenza   Vaccine 0.5 milliLiter(s) IntraMuscular once  ketorolac   Injectable 30 milliGRAM(s) IV Push every 6 hours  lactated ringers. 1000 milliLiter(s) (75 mL/Hr) IV Continuous <Continuous>  ondansetron Injectable 4 milliGRAM(s) IV Push every 6 hours  ondansetron Injectable 4 milliGRAM(s) IV Push once  piperacillin/tazobactam IVPB.. 3.375 Gram(s) IV Intermittent every 8 hours  senna 2 Tablet(s) Oral at bedtime      MEDICATIONS  (PRN):  diphenhydrAMINE 25 milliGRAM(s) Oral every 6 hours PRN Pruritus  lanolin Ointment 1 Application(s) Topical every 6 hours PRN Sore Nipples  magnesium hydroxide Suspension 30 milliLiter(s) Oral two times a day PRN Constipation  metoclopramide Injectable 10 milliGRAM(s) IV Push every 6 hours PRN Nausea  oxyCODONE    IR 5 milliGRAM(s) Oral every 3 hours PRN Moderate to Severe Pain (4-10)  oxyCODONE    IR 5 milliGRAM(s) Oral once PRN Moderate to Severe Pain (4-10)  simethicone 80 milliGRAM(s) Chew every 4 hours PRN Gas      Labs:  Blood type: A Positive  Rubella IgG: RPR: Negative                          9.2<L>   8.36 >-----------< 145<L>    ( 11-17 @ 06:40 )             27.5<L>                        8.5<L>   8.69 >-----------< 109<L>    ( 11-16 @ 00:50 )             24.7<L>                        8.4<L>   8.42 >-----------< 111<L>    ( 11-15 @ 06:00 )             23.4<L>                        9.0<L>   8.36 >-----------< 114<L>    ( 11-15 @ 00:37 )             24.8<L>                        8.8<L>   9.28 >-----------< 107<L>    ( 11-14 @ 21:40 )             25.5<L>                        9.5<L>   10.50 >-----------< 108<L>    ( 11-14 @ 18:31 )             27.5<L>                        7.6<L>   13.42<H> >-----------< 152    ( 11-14 @ 14:15 )             22.4<L>                        8.8<L>   15.88<H> >-----------< 227    ( 11-14 @ 11:40 )             27.6<L>    11-16-23 @ 00:50      135  |  103  |  9   ----------------------------<  90  3.5   |  23  |  0.53    11-15-23 @ 00:37      139  |  105  |  5<L>  ----------------------------<  105<H>  4.2   |  23  |  0.38<L>    11-14-23 @ 21:40      138  |  106  |  6<L>  ----------------------------<  116<H>  4.3   |  22  |  0.34<L>    11-14-23 @ 18:31      138  |  107  |  6<L>  ----------------------------<  123<H>  3.9   |  20<L>  |  0.31<L>        Ca    7.4<L>      16 Nov 2023 00:50  Ca    8.4      15 Nov 2023 00:37  Ca    8.8      14 Nov 2023 21:40  Ca    9.2      14 Nov 2023 18:31  Phos  4.8<H>     11-16  Phos  4.6<H>     11-15  Phos  4.5     11-14  Phos  4.0     11-14  Mg     1.70     11-16  Mg     2.10     11-15  Mg     1.40<L>     11-14  Mg     1.30<L>     11-14            PE:  General: NAD  CV: RRR  Pulm: CTAB  Abdomen: Soft, appropriately tender, Fundus firm incision c/d/i.  VE: No heavy vaginal bleeding  Extremities: No erythema, no pitting edema

## 2023-11-17 NOTE — PROVIDER CONTACT NOTE (OTHER) - BACKGROUND
Pt is a s/p rpt  w/ BTL, s/p SICU, s/p ex-lap, s/p MTP
patient is s/p  and RTOR for bleeding- abthera vac placed
33F s/p  with salpingectomy  post op became hypotensive and tachycardic , RTOR with unknown source of bleed found, abthera placed. RTOR  for closure. Patient  intubated and sedated

## 2023-11-17 NOTE — PROVIDER CONTACT NOTE (OTHER) - ACTION/TREATMENT ORDERED:
Changed to NPO status, IV fluids maintained at 75cc/h, RTC nausea meds. Changed to NPO status, IV fluids maintained at 75cc/h, RTC nausea meds. Ice pack to perineum. No recs re blurry vision at this time as it is improving. Will CTM

## 2023-11-17 NOTE — PROVIDER CONTACT NOTE (OTHER) - REASON
Late Entry - pt c/o nausea with emesis, blurry vision with slight improvement from AM and left labial swelling
patient c/o blurry vision
abthera vac not suctioning

## 2023-11-18 LAB
ALBUMIN SERPL ELPH-MCNC: 2.3 G/DL — LOW (ref 3.3–5)
ALBUMIN SERPL ELPH-MCNC: 2.3 G/DL — LOW (ref 3.3–5)
ALP SERPL-CCNC: 83 U/L — SIGNIFICANT CHANGE UP (ref 40–120)
ALP SERPL-CCNC: 83 U/L — SIGNIFICANT CHANGE UP (ref 40–120)
ALT FLD-CCNC: 14 U/L — SIGNIFICANT CHANGE UP (ref 4–33)
ALT FLD-CCNC: 14 U/L — SIGNIFICANT CHANGE UP (ref 4–33)
ANION GAP SERPL CALC-SCNC: 13 MMOL/L — SIGNIFICANT CHANGE UP (ref 7–14)
ANION GAP SERPL CALC-SCNC: 13 MMOL/L — SIGNIFICANT CHANGE UP (ref 7–14)
APTT BLD: 32.9 SEC — SIGNIFICANT CHANGE UP (ref 24.5–35.6)
APTT BLD: 32.9 SEC — SIGNIFICANT CHANGE UP (ref 24.5–35.6)
AST SERPL-CCNC: 17 U/L — SIGNIFICANT CHANGE UP (ref 4–32)
AST SERPL-CCNC: 17 U/L — SIGNIFICANT CHANGE UP (ref 4–32)
BASOPHILS # BLD AUTO: 0.01 K/UL — SIGNIFICANT CHANGE UP (ref 0–0.2)
BASOPHILS # BLD AUTO: 0.01 K/UL — SIGNIFICANT CHANGE UP (ref 0–0.2)
BASOPHILS NFR BLD AUTO: 0.1 % — SIGNIFICANT CHANGE UP (ref 0–2)
BASOPHILS NFR BLD AUTO: 0.1 % — SIGNIFICANT CHANGE UP (ref 0–2)
BILIRUB SERPL-MCNC: 0.4 MG/DL — SIGNIFICANT CHANGE UP (ref 0.2–1.2)
BILIRUB SERPL-MCNC: 0.4 MG/DL — SIGNIFICANT CHANGE UP (ref 0.2–1.2)
BUN SERPL-MCNC: 10 MG/DL — SIGNIFICANT CHANGE UP (ref 7–23)
BUN SERPL-MCNC: 10 MG/DL — SIGNIFICANT CHANGE UP (ref 7–23)
CALCIUM SERPL-MCNC: 8 MG/DL — LOW (ref 8.4–10.5)
CALCIUM SERPL-MCNC: 8 MG/DL — LOW (ref 8.4–10.5)
CHLORIDE SERPL-SCNC: 107 MMOL/L — SIGNIFICANT CHANGE UP (ref 98–107)
CHLORIDE SERPL-SCNC: 107 MMOL/L — SIGNIFICANT CHANGE UP (ref 98–107)
CO2 SERPL-SCNC: 19 MMOL/L — LOW (ref 22–31)
CO2 SERPL-SCNC: 19 MMOL/L — LOW (ref 22–31)
CREAT SERPL-MCNC: 0.55 MG/DL — SIGNIFICANT CHANGE UP (ref 0.5–1.3)
CREAT SERPL-MCNC: 0.55 MG/DL — SIGNIFICANT CHANGE UP (ref 0.5–1.3)
EGFR: 123 ML/MIN/1.73M2 — SIGNIFICANT CHANGE UP
EGFR: 123 ML/MIN/1.73M2 — SIGNIFICANT CHANGE UP
EOSINOPHIL # BLD AUTO: 0.13 K/UL — SIGNIFICANT CHANGE UP (ref 0–0.5)
EOSINOPHIL # BLD AUTO: 0.13 K/UL — SIGNIFICANT CHANGE UP (ref 0–0.5)
EOSINOPHIL NFR BLD AUTO: 1.9 % — SIGNIFICANT CHANGE UP (ref 0–6)
EOSINOPHIL NFR BLD AUTO: 1.9 % — SIGNIFICANT CHANGE UP (ref 0–6)
FIBRINOGEN PPP-MCNC: 680 MG/DL — HIGH (ref 200–465)
FIBRINOGEN PPP-MCNC: 680 MG/DL — HIGH (ref 200–465)
GLUCOSE SERPL-MCNC: 71 MG/DL — SIGNIFICANT CHANGE UP (ref 70–99)
GLUCOSE SERPL-MCNC: 71 MG/DL — SIGNIFICANT CHANGE UP (ref 70–99)
HCT VFR BLD CALC: 27.4 % — LOW (ref 34.5–45)
HCT VFR BLD CALC: 27.4 % — LOW (ref 34.5–45)
HGB BLD-MCNC: 9.2 G/DL — LOW (ref 11.5–15.5)
HGB BLD-MCNC: 9.2 G/DL — LOW (ref 11.5–15.5)
IANC: 4.92 K/UL — SIGNIFICANT CHANGE UP (ref 1.8–7.4)
IANC: 4.92 K/UL — SIGNIFICANT CHANGE UP (ref 1.8–7.4)
IMM GRANULOCYTES NFR BLD AUTO: 0.6 % — SIGNIFICANT CHANGE UP (ref 0–0.9)
IMM GRANULOCYTES NFR BLD AUTO: 0.6 % — SIGNIFICANT CHANGE UP (ref 0–0.9)
INR BLD: <0.9 RATIO — SIGNIFICANT CHANGE UP (ref 0.85–1.18)
INR BLD: <0.9 RATIO — SIGNIFICANT CHANGE UP (ref 0.85–1.18)
LYMPHOCYTES # BLD AUTO: 0.94 K/UL — LOW (ref 1–3.3)
LYMPHOCYTES # BLD AUTO: 0.94 K/UL — LOW (ref 1–3.3)
LYMPHOCYTES # BLD AUTO: 14 % — SIGNIFICANT CHANGE UP (ref 13–44)
LYMPHOCYTES # BLD AUTO: 14 % — SIGNIFICANT CHANGE UP (ref 13–44)
MCHC RBC-ENTMCNC: 30.5 PG — SIGNIFICANT CHANGE UP (ref 27–34)
MCHC RBC-ENTMCNC: 30.5 PG — SIGNIFICANT CHANGE UP (ref 27–34)
MCHC RBC-ENTMCNC: 33.6 GM/DL — SIGNIFICANT CHANGE UP (ref 32–36)
MCHC RBC-ENTMCNC: 33.6 GM/DL — SIGNIFICANT CHANGE UP (ref 32–36)
MCV RBC AUTO: 90.7 FL — SIGNIFICANT CHANGE UP (ref 80–100)
MCV RBC AUTO: 90.7 FL — SIGNIFICANT CHANGE UP (ref 80–100)
MONOCYTES # BLD AUTO: 0.68 K/UL — SIGNIFICANT CHANGE UP (ref 0–0.9)
MONOCYTES # BLD AUTO: 0.68 K/UL — SIGNIFICANT CHANGE UP (ref 0–0.9)
MONOCYTES NFR BLD AUTO: 10.1 % — SIGNIFICANT CHANGE UP (ref 2–14)
MONOCYTES NFR BLD AUTO: 10.1 % — SIGNIFICANT CHANGE UP (ref 2–14)
NEUTROPHILS # BLD AUTO: 4.92 K/UL — SIGNIFICANT CHANGE UP (ref 1.8–7.4)
NEUTROPHILS # BLD AUTO: 4.92 K/UL — SIGNIFICANT CHANGE UP (ref 1.8–7.4)
NEUTROPHILS NFR BLD AUTO: 73.3 % — SIGNIFICANT CHANGE UP (ref 43–77)
NEUTROPHILS NFR BLD AUTO: 73.3 % — SIGNIFICANT CHANGE UP (ref 43–77)
NRBC # BLD: 0 /100 WBCS — SIGNIFICANT CHANGE UP (ref 0–0)
NRBC # BLD: 0 /100 WBCS — SIGNIFICANT CHANGE UP (ref 0–0)
NRBC # FLD: 0 K/UL — SIGNIFICANT CHANGE UP (ref 0–0)
NRBC # FLD: 0 K/UL — SIGNIFICANT CHANGE UP (ref 0–0)
PLATELET # BLD AUTO: 195 K/UL — SIGNIFICANT CHANGE UP (ref 150–400)
PLATELET # BLD AUTO: 195 K/UL — SIGNIFICANT CHANGE UP (ref 150–400)
POTASSIUM SERPL-MCNC: 3.6 MMOL/L — SIGNIFICANT CHANGE UP (ref 3.5–5.3)
POTASSIUM SERPL-MCNC: 3.6 MMOL/L — SIGNIFICANT CHANGE UP (ref 3.5–5.3)
POTASSIUM SERPL-SCNC: 3.6 MMOL/L — SIGNIFICANT CHANGE UP (ref 3.5–5.3)
POTASSIUM SERPL-SCNC: 3.6 MMOL/L — SIGNIFICANT CHANGE UP (ref 3.5–5.3)
PROT SERPL-MCNC: 5.1 G/DL — LOW (ref 6–8.3)
PROT SERPL-MCNC: 5.1 G/DL — LOW (ref 6–8.3)
PROTHROM AB SERPL-ACNC: 10 SEC — SIGNIFICANT CHANGE UP (ref 9.5–13)
PROTHROM AB SERPL-ACNC: 10 SEC — SIGNIFICANT CHANGE UP (ref 9.5–13)
RBC # BLD: 3.02 M/UL — LOW (ref 3.8–5.2)
RBC # BLD: 3.02 M/UL — LOW (ref 3.8–5.2)
RBC # FLD: 14 % — SIGNIFICANT CHANGE UP (ref 10.3–14.5)
RBC # FLD: 14 % — SIGNIFICANT CHANGE UP (ref 10.3–14.5)
SODIUM SERPL-SCNC: 139 MMOL/L — SIGNIFICANT CHANGE UP (ref 135–145)
SODIUM SERPL-SCNC: 139 MMOL/L — SIGNIFICANT CHANGE UP (ref 135–145)
WBC # BLD: 6.72 K/UL — SIGNIFICANT CHANGE UP (ref 3.8–10.5)
WBC # BLD: 6.72 K/UL — SIGNIFICANT CHANGE UP (ref 3.8–10.5)
WBC # FLD AUTO: 6.72 K/UL — SIGNIFICANT CHANGE UP (ref 3.8–10.5)
WBC # FLD AUTO: 6.72 K/UL — SIGNIFICANT CHANGE UP (ref 3.8–10.5)

## 2023-11-18 RX ORDER — FERROUS SULFATE 325(65) MG
325 TABLET ORAL DAILY
Refills: 0 | Status: DISCONTINUED | OUTPATIENT
Start: 2023-11-18 | End: 2023-11-23

## 2023-11-18 RX ORDER — SODIUM CHLORIDE 9 MG/ML
1000 INJECTION, SOLUTION INTRAVENOUS
Refills: 0 | Status: DISCONTINUED | OUTPATIENT
Start: 2023-11-18 | End: 2023-11-19

## 2023-11-18 RX ADMIN — Medication 600 MILLIGRAM(S): at 18:05

## 2023-11-18 RX ADMIN — Medication 600 MILLIGRAM(S): at 06:23

## 2023-11-18 RX ADMIN — SIMETHICONE 80 MILLIGRAM(S): 80 TABLET, CHEWABLE ORAL at 15:21

## 2023-11-18 RX ADMIN — SIMETHICONE 80 MILLIGRAM(S): 80 TABLET, CHEWABLE ORAL at 00:25

## 2023-11-18 RX ADMIN — Medication 200 MILLIGRAM(S): at 18:05

## 2023-11-18 RX ADMIN — ONDANSETRON 4 MILLIGRAM(S): 8 TABLET, FILM COATED ORAL at 12:55

## 2023-11-18 RX ADMIN — HEPARIN SODIUM 5000 UNIT(S): 5000 INJECTION INTRAVENOUS; SUBCUTANEOUS at 03:00

## 2023-11-18 RX ADMIN — Medication 200 MILLIGRAM(S): at 10:50

## 2023-11-18 RX ADMIN — Medication 975 MILLIGRAM(S): at 09:45

## 2023-11-18 RX ADMIN — Medication 600 MILLIGRAM(S): at 06:55

## 2023-11-18 RX ADMIN — Medication 600 MILLIGRAM(S): at 23:40

## 2023-11-18 RX ADMIN — Medication 975 MILLIGRAM(S): at 15:46

## 2023-11-18 RX ADMIN — SIMETHICONE 80 MILLIGRAM(S): 80 TABLET, CHEWABLE ORAL at 10:50

## 2023-11-18 RX ADMIN — Medication 975 MILLIGRAM(S): at 20:55

## 2023-11-18 RX ADMIN — Medication 600 MILLIGRAM(S): at 12:02

## 2023-11-18 RX ADMIN — Medication 600 MILLIGRAM(S): at 18:57

## 2023-11-18 RX ADMIN — HEPARIN SODIUM 5000 UNIT(S): 5000 INJECTION INTRAVENOUS; SUBCUTANEOUS at 15:21

## 2023-11-18 RX ADMIN — SIMETHICONE 80 MILLIGRAM(S): 80 TABLET, CHEWABLE ORAL at 20:55

## 2023-11-18 RX ADMIN — SENNA PLUS 2 TABLET(S): 8.6 TABLET ORAL at 00:25

## 2023-11-18 RX ADMIN — Medication 975 MILLIGRAM(S): at 09:11

## 2023-11-18 RX ADMIN — Medication 200 MILLIGRAM(S): at 23:40

## 2023-11-18 RX ADMIN — Medication 325 MILLIGRAM(S): at 12:02

## 2023-11-18 RX ADMIN — Medication 600 MILLIGRAM(S): at 00:24

## 2023-11-18 RX ADMIN — Medication 600 MILLIGRAM(S): at 00:55

## 2023-11-18 RX ADMIN — Medication 975 MILLIGRAM(S): at 15:21

## 2023-11-18 RX ADMIN — Medication 975 MILLIGRAM(S): at 21:25

## 2023-11-18 RX ADMIN — Medication 600 MILLIGRAM(S): at 12:35

## 2023-11-18 NOTE — PROGRESS NOTE ADULT - ASSESSMENT
33 year old  s/p  section with salpingectomy EBL 363cc, UOP 350cc . Post op patient hypotensive and tachycardic, + Fast, went to the OR found to have 1 L of blood in abdomen without a found source of the bleeding. Incision was extended to a ventral midline incision and an intra-operative consult with general surgery. MTP was called. Pt received 5u pRBC + 4u FFP + 1u of plt +2u of Riastep.  Abthera vac placed and patient went to CT scan for angiogram which was negative for a bleeding. Pt was transferred intubated to the SICU for monitoring. Patient now s/p RTOR  for exploration and closure. Patient stepped down from SICU overnight. Post operative course complicated by ileus. Pt demonstrating signs of clinical improvement.     Neuro: Pain well controlled on current regimen.   Cardio:   - 5u pRBC + 4u FFP + 1u of plt +2u of Riastep  - s/p A-line   - Hct stable, F/U AM labs   Pulm: s/p eTT, saturating well on RA   GI: NPO, consider advancing to CLD as tolerated   :  - LR@75. Voiding spontaneously.   - Cr. 0.34. No evidence of an HA    Gyn: Lochia wnl. Continue with counts  ID: Afebrile without leukocytosis. C/W Luther ( - )      Linda Carter, PGY-3

## 2023-11-18 NOTE — PHYSICAL THERAPY INITIAL EVALUATION ADULT - ADDITIONAL COMMENTS
Mandarin and Slovenian speaking pt reporting that she lives in an apartment with her , 2 stair cases to negotiate, no recent falls, fully independent at baseline does not use DME.    Patients Current SpO2: 99%

## 2023-11-18 NOTE — PROGRESS NOTE ADULT - SUBJECTIVE AND OBJECTIVE BOX
Pt seen with the assistance of a mandarin  ID# [id number]    34yo POD#4 s/p rLTCS & BS and POD#2 s/p RTOR for closure. Pt currently with post operative ileus. NPO overnight with trial of clear liquids.   Overnight pt with 3 small bouts of emesis after sips of apple juice. Nausea improved - responsive to IV anti-emetics and not noted at rest. Pain better controlled, on IV pain regimen. Voiding spontaneously. Standing up to use commode with assistance.  Denies heavy vaginal bleeding, CP/SOB, headache, visual disturbances, epigastric pain.     O:   Vital Signs Last 24 Hrs  T(C): 37 (18 Nov 2023 02:00), Max: 37.2 (17 Nov 2023 11:35)  T(F): 98.6 (18 Nov 2023 02:00), Max: 98.9 (17 Nov 2023 11:35)  HR: 78 (18 Nov 2023 02:00) (67 - 78)  BP: 118/63 (18 Nov 2023 02:00) (113/60 - 121/65)  BP(mean): --  RR: 17 (18 Nov 2023 02:00) (16 - 17)  SpO2: 96% (18 Nov 2023 02:00) (96% - 99%)    Parameters below as of 18 Nov 2023 02:00  Patient On (Oxygen Delivery Method): room air    Labs:  Blood type: A Positive  Rubella IgG: RPR: Negative                          9.2<L>   8.36 >-----------< 145<L>    ( 11-17 @ 06:40 )             27.5<L>                        8.5<L>   8.69 >-----------< 109<L>    ( 11-16 @ 00:50 )             24.7<L>                        8.4<L>   8.42 >-----------< 111<L>    ( 11-15 @ 06:00 )             23.4<L>    11-17-23 @ 06:40      139  |  106  |  8   ----------------------------<  82  4.0   |  20<L>  |  0.49<L>    11-16-23 @ 00:50      135  |  103  |  9   ----------------------------<  90  3.5   |  23  |  0.53        Ca    7.9<L>      17 Nov 2023 06:40  Ca    7.4<L>      16 Nov 2023 00:50  Phos  3.9     11-17  Phos  4.8<H>     11-16  Mg     1.90     11-17  Mg     1.70     11-16    TPro  4.8<L>  /  Alb  2.0<L>  /  TBili  0.4  /  DBili  x   /  AST  20  /  ALT  15  /  AlkPhos  98  11-17-23 @ 06:40          acetaminophen     Tablet .. 975 milliGRAM(s) Oral <User Schedule>  diphenhydrAMINE 25 milliGRAM(s) Oral every 6 hours PRN  diphtheria/tetanus/pertussis (acellular) Vaccine (Adacel) 0.5 milliLiter(s) IntraMuscular once  heparin   Injectable 5000 Unit(s) SubCutaneous every 12 hours  ibuprofen  Tablet. 600 milliGRAM(s) Oral every 6 hours  influenza   Vaccine 0.5 milliLiter(s) IntraMuscular once  lactated ringers Bolus 1000 milliLiter(s) IV Bolus once  lactated ringers. 1000 milliLiter(s) IV Continuous <Continuous>  lanolin Ointment 1 Application(s) Topical every 6 hours PRN  magnesium hydroxide Suspension 30 milliLiter(s) Oral two times a day PRN  metoclopramide Injectable 10 milliGRAM(s) IV Push every 6 hours PRN  ondansetron Injectable 4 milliGRAM(s) IV Push every 6 hours  ondansetron Injectable 4 milliGRAM(s) IV Push once  oxyCODONE    IR 5 milliGRAM(s) Oral every 3 hours PRN  oxyCODONE    IR 5 milliGRAM(s) Oral once PRN  senna 2 Tablet(s) Oral at bedtime  simethicone 80 milliGRAM(s) Chew every 4 hours PRN      PE:  Pt alert and oriented x4, improved from days prior. Responsive to line of questioning   Diffuse edema, improved from days prior but noted in face as well as upper and lower extremities. Vulvar swelling consistent with days prior. Non pitting.   Abdomen is soft, mildly distended, improved from days prior. Appropriately tender to palpation. Not tense. No rebound or guarding.   Incision: Midline vertical and low transverse incision with staples. No surrounding signs of infections.

## 2023-11-18 NOTE — PHYSICAL THERAPY INITIAL EVALUATION ADULT - NSPTDISCHREC_GEN_A_CORE
Home no skilled PT needs.  Pt would benefit from a  rolling walker in order to address mobility related ADL limitations associated with a Low transverse  section

## 2023-11-19 LAB
ALBUMIN SERPL ELPH-MCNC: 2.4 G/DL — LOW (ref 3.3–5)
ALBUMIN SERPL ELPH-MCNC: 2.4 G/DL — LOW (ref 3.3–5)
ALP SERPL-CCNC: 72 U/L — SIGNIFICANT CHANGE UP (ref 40–120)
ALP SERPL-CCNC: 72 U/L — SIGNIFICANT CHANGE UP (ref 40–120)
ALT FLD-CCNC: 15 U/L — SIGNIFICANT CHANGE UP (ref 4–33)
ALT FLD-CCNC: 15 U/L — SIGNIFICANT CHANGE UP (ref 4–33)
ANION GAP SERPL CALC-SCNC: 14 MMOL/L — SIGNIFICANT CHANGE UP (ref 7–14)
ANION GAP SERPL CALC-SCNC: 14 MMOL/L — SIGNIFICANT CHANGE UP (ref 7–14)
APTT BLD: 30.5 SEC — SIGNIFICANT CHANGE UP (ref 24.5–35.6)
APTT BLD: 30.5 SEC — SIGNIFICANT CHANGE UP (ref 24.5–35.6)
AST SERPL-CCNC: 15 U/L — SIGNIFICANT CHANGE UP (ref 4–32)
AST SERPL-CCNC: 15 U/L — SIGNIFICANT CHANGE UP (ref 4–32)
BASOPHILS # BLD AUTO: 0.02 K/UL — SIGNIFICANT CHANGE UP (ref 0–0.2)
BASOPHILS # BLD AUTO: 0.02 K/UL — SIGNIFICANT CHANGE UP (ref 0–0.2)
BASOPHILS NFR BLD AUTO: 0.3 % — SIGNIFICANT CHANGE UP (ref 0–2)
BASOPHILS NFR BLD AUTO: 0.3 % — SIGNIFICANT CHANGE UP (ref 0–2)
BILIRUB SERPL-MCNC: 0.3 MG/DL — SIGNIFICANT CHANGE UP (ref 0.2–1.2)
BILIRUB SERPL-MCNC: 0.3 MG/DL — SIGNIFICANT CHANGE UP (ref 0.2–1.2)
BUN SERPL-MCNC: 9 MG/DL — SIGNIFICANT CHANGE UP (ref 7–23)
BUN SERPL-MCNC: 9 MG/DL — SIGNIFICANT CHANGE UP (ref 7–23)
CALCIUM SERPL-MCNC: 8.1 MG/DL — LOW (ref 8.4–10.5)
CALCIUM SERPL-MCNC: 8.1 MG/DL — LOW (ref 8.4–10.5)
CHLORIDE SERPL-SCNC: 108 MMOL/L — HIGH (ref 98–107)
CHLORIDE SERPL-SCNC: 108 MMOL/L — HIGH (ref 98–107)
CO2 SERPL-SCNC: 18 MMOL/L — LOW (ref 22–31)
CO2 SERPL-SCNC: 18 MMOL/L — LOW (ref 22–31)
CREAT SERPL-MCNC: 0.54 MG/DL — SIGNIFICANT CHANGE UP (ref 0.5–1.3)
CREAT SERPL-MCNC: 0.54 MG/DL — SIGNIFICANT CHANGE UP (ref 0.5–1.3)
EGFR: 124 ML/MIN/1.73M2 — SIGNIFICANT CHANGE UP
EGFR: 124 ML/MIN/1.73M2 — SIGNIFICANT CHANGE UP
EOSINOPHIL # BLD AUTO: 0.1 K/UL — SIGNIFICANT CHANGE UP (ref 0–0.5)
EOSINOPHIL # BLD AUTO: 0.1 K/UL — SIGNIFICANT CHANGE UP (ref 0–0.5)
EOSINOPHIL NFR BLD AUTO: 1.5 % — SIGNIFICANT CHANGE UP (ref 0–6)
EOSINOPHIL NFR BLD AUTO: 1.5 % — SIGNIFICANT CHANGE UP (ref 0–6)
FIBRINOGEN PPP-MCNC: 505 MG/DL — HIGH (ref 200–465)
FIBRINOGEN PPP-MCNC: 505 MG/DL — HIGH (ref 200–465)
GLUCOSE SERPL-MCNC: 82 MG/DL — SIGNIFICANT CHANGE UP (ref 70–99)
GLUCOSE SERPL-MCNC: 82 MG/DL — SIGNIFICANT CHANGE UP (ref 70–99)
HCT VFR BLD CALC: 25.8 % — LOW (ref 34.5–45)
HCT VFR BLD CALC: 25.8 % — LOW (ref 34.5–45)
HGB BLD-MCNC: 8.7 G/DL — LOW (ref 11.5–15.5)
HGB BLD-MCNC: 8.7 G/DL — LOW (ref 11.5–15.5)
IANC: 4.52 K/UL — SIGNIFICANT CHANGE UP (ref 1.8–7.4)
IANC: 4.52 K/UL — SIGNIFICANT CHANGE UP (ref 1.8–7.4)
IMM GRANULOCYTES NFR BLD AUTO: 0.6 % — SIGNIFICANT CHANGE UP (ref 0–0.9)
IMM GRANULOCYTES NFR BLD AUTO: 0.6 % — SIGNIFICANT CHANGE UP (ref 0–0.9)
INR BLD: 0.9 RATIO — SIGNIFICANT CHANGE UP (ref 0.85–1.18)
INR BLD: 0.9 RATIO — SIGNIFICANT CHANGE UP (ref 0.85–1.18)
LYMPHOCYTES # BLD AUTO: 1.03 K/UL — SIGNIFICANT CHANGE UP (ref 1–3.3)
LYMPHOCYTES # BLD AUTO: 1.03 K/UL — SIGNIFICANT CHANGE UP (ref 1–3.3)
LYMPHOCYTES # BLD AUTO: 15.9 % — SIGNIFICANT CHANGE UP (ref 13–44)
LYMPHOCYTES # BLD AUTO: 15.9 % — SIGNIFICANT CHANGE UP (ref 13–44)
MCHC RBC-ENTMCNC: 30.2 PG — SIGNIFICANT CHANGE UP (ref 27–34)
MCHC RBC-ENTMCNC: 30.2 PG — SIGNIFICANT CHANGE UP (ref 27–34)
MCHC RBC-ENTMCNC: 33.7 GM/DL — SIGNIFICANT CHANGE UP (ref 32–36)
MCHC RBC-ENTMCNC: 33.7 GM/DL — SIGNIFICANT CHANGE UP (ref 32–36)
MCV RBC AUTO: 89.6 FL — SIGNIFICANT CHANGE UP (ref 80–100)
MCV RBC AUTO: 89.6 FL — SIGNIFICANT CHANGE UP (ref 80–100)
MONOCYTES # BLD AUTO: 0.75 K/UL — SIGNIFICANT CHANGE UP (ref 0–0.9)
MONOCYTES # BLD AUTO: 0.75 K/UL — SIGNIFICANT CHANGE UP (ref 0–0.9)
MONOCYTES NFR BLD AUTO: 11.6 % — SIGNIFICANT CHANGE UP (ref 2–14)
MONOCYTES NFR BLD AUTO: 11.6 % — SIGNIFICANT CHANGE UP (ref 2–14)
NEUTROPHILS # BLD AUTO: 4.52 K/UL — SIGNIFICANT CHANGE UP (ref 1.8–7.4)
NEUTROPHILS # BLD AUTO: 4.52 K/UL — SIGNIFICANT CHANGE UP (ref 1.8–7.4)
NEUTROPHILS NFR BLD AUTO: 70.1 % — SIGNIFICANT CHANGE UP (ref 43–77)
NEUTROPHILS NFR BLD AUTO: 70.1 % — SIGNIFICANT CHANGE UP (ref 43–77)
NRBC # BLD: 0 /100 WBCS — SIGNIFICANT CHANGE UP (ref 0–0)
NRBC # BLD: 0 /100 WBCS — SIGNIFICANT CHANGE UP (ref 0–0)
NRBC # FLD: 0 K/UL — SIGNIFICANT CHANGE UP (ref 0–0)
NRBC # FLD: 0 K/UL — SIGNIFICANT CHANGE UP (ref 0–0)
PLATELET # BLD AUTO: 217 K/UL — SIGNIFICANT CHANGE UP (ref 150–400)
PLATELET # BLD AUTO: 217 K/UL — SIGNIFICANT CHANGE UP (ref 150–400)
POTASSIUM SERPL-MCNC: 3.5 MMOL/L — SIGNIFICANT CHANGE UP (ref 3.5–5.3)
POTASSIUM SERPL-MCNC: 3.5 MMOL/L — SIGNIFICANT CHANGE UP (ref 3.5–5.3)
POTASSIUM SERPL-SCNC: 3.5 MMOL/L — SIGNIFICANT CHANGE UP (ref 3.5–5.3)
POTASSIUM SERPL-SCNC: 3.5 MMOL/L — SIGNIFICANT CHANGE UP (ref 3.5–5.3)
PROT SERPL-MCNC: 4.9 G/DL — LOW (ref 6–8.3)
PROT SERPL-MCNC: 4.9 G/DL — LOW (ref 6–8.3)
PROTHROM AB SERPL-ACNC: 10.1 SEC — SIGNIFICANT CHANGE UP (ref 9.5–13)
PROTHROM AB SERPL-ACNC: 10.1 SEC — SIGNIFICANT CHANGE UP (ref 9.5–13)
RBC # BLD: 2.88 M/UL — LOW (ref 3.8–5.2)
RBC # BLD: 2.88 M/UL — LOW (ref 3.8–5.2)
RBC # FLD: 13.8 % — SIGNIFICANT CHANGE UP (ref 10.3–14.5)
RBC # FLD: 13.8 % — SIGNIFICANT CHANGE UP (ref 10.3–14.5)
SODIUM SERPL-SCNC: 140 MMOL/L — SIGNIFICANT CHANGE UP (ref 135–145)
SODIUM SERPL-SCNC: 140 MMOL/L — SIGNIFICANT CHANGE UP (ref 135–145)
WBC # BLD: 6.46 K/UL — SIGNIFICANT CHANGE UP (ref 3.8–10.5)
WBC # BLD: 6.46 K/UL — SIGNIFICANT CHANGE UP (ref 3.8–10.5)
WBC # FLD AUTO: 6.46 K/UL — SIGNIFICANT CHANGE UP (ref 3.8–10.5)
WBC # FLD AUTO: 6.46 K/UL — SIGNIFICANT CHANGE UP (ref 3.8–10.5)

## 2023-11-19 RX ADMIN — Medication 325 MILLIGRAM(S): at 11:56

## 2023-11-19 RX ADMIN — Medication 600 MILLIGRAM(S): at 06:15

## 2023-11-19 RX ADMIN — Medication 975 MILLIGRAM(S): at 09:20

## 2023-11-19 RX ADMIN — Medication 200 MILLIGRAM(S): at 17:46

## 2023-11-19 RX ADMIN — Medication 975 MILLIGRAM(S): at 03:34

## 2023-11-19 RX ADMIN — Medication 975 MILLIGRAM(S): at 16:00

## 2023-11-19 RX ADMIN — Medication 975 MILLIGRAM(S): at 08:49

## 2023-11-19 RX ADMIN — Medication 975 MILLIGRAM(S): at 20:58

## 2023-11-19 RX ADMIN — SIMETHICONE 80 MILLIGRAM(S): 80 TABLET, CHEWABLE ORAL at 08:49

## 2023-11-19 RX ADMIN — SIMETHICONE 80 MILLIGRAM(S): 80 TABLET, CHEWABLE ORAL at 03:34

## 2023-11-19 RX ADMIN — SIMETHICONE 80 MILLIGRAM(S): 80 TABLET, CHEWABLE ORAL at 20:58

## 2023-11-19 RX ADMIN — Medication 600 MILLIGRAM(S): at 11:56

## 2023-11-19 RX ADMIN — Medication 200 MILLIGRAM(S): at 11:56

## 2023-11-19 RX ADMIN — Medication 975 MILLIGRAM(S): at 21:30

## 2023-11-19 RX ADMIN — SIMETHICONE 80 MILLIGRAM(S): 80 TABLET, CHEWABLE ORAL at 15:27

## 2023-11-19 RX ADMIN — HEPARIN SODIUM 5000 UNIT(S): 5000 INJECTION INTRAVENOUS; SUBCUTANEOUS at 03:34

## 2023-11-19 RX ADMIN — Medication 975 MILLIGRAM(S): at 15:27

## 2023-11-19 RX ADMIN — HEPARIN SODIUM 5000 UNIT(S): 5000 INJECTION INTRAVENOUS; SUBCUTANEOUS at 15:26

## 2023-11-19 RX ADMIN — Medication 600 MILLIGRAM(S): at 12:30

## 2023-11-19 RX ADMIN — Medication 600 MILLIGRAM(S): at 00:20

## 2023-11-19 RX ADMIN — Medication 600 MILLIGRAM(S): at 17:46

## 2023-11-19 RX ADMIN — Medication 600 MILLIGRAM(S): at 05:45

## 2023-11-19 RX ADMIN — Medication 600 MILLIGRAM(S): at 18:53

## 2023-11-19 RX ADMIN — Medication 200 MILLIGRAM(S): at 05:45

## 2023-11-19 RX ADMIN — Medication 975 MILLIGRAM(S): at 04:05

## 2023-11-19 NOTE — PROGRESS NOTE ADULT - ASSESSMENT
33 year old  s/p  section with salpingectomy EBL 363cc, UOP 350cc . Post op patient hypotensive and tachycardic, + Fast, went to the OR found to have 1 L of blood in abdomen without a found source of the bleeding. Incision was extended to a ventral midline incision and an intra-operative consult with general surgery. MTP was called. Pt received 5u pRBC + 4u FFP + 1u of plt +2u of Riastep.  Abthera vac placed and patient went to CT scan for angiogram which was negative for a bleeding. Pt was transferred intubated to the SICU for monitoring. Patient now s/p RTOR  for exploration and closure. Patient stepped down from SICU overnight. Post operative course complicated by ileus. Pt demonstrating signs of clinical improvement.     Neuro: Pain well controlled on current regimen.   Cardio:   - 5u pRBC + 4u FFP + 1u of plt +2u of Riastep  - s/p A-line   - Hct stable, F/U AM labs   Pulm: s/p eTT, saturating well on RA   GI: NPO, consider advancing to CLD as tolerated   :  - LR@75. Voiding spontaneously.   - Cr. 0.34. No evidence of an HA    Gyn: Lochia wnl. Continue with counts  ID: Afebrile without leukocytosis. C/W Zosyn ( - )     33 year old  s/p  section with salpingectomy EBL 363cc, UOP 350cc . Post op patient hypotensive and tachycardic, + Fast, went to the OR found to have 1 L of blood in abdomen without a found source of the bleeding. Incision was extended to a ventral midline incision and an intra-operative consult with general surgery. MTP was called. Pt received 5u pRBC + 4u FFP + 1u of plt +2u of Riastep.  Abthera vac placed and patient went to CT scan for angiogram which was negative for a bleeding. Pt was transferred intubated to the SICU for monitoring. Patient now s/p RTOR  for exploration and closure. Patient stepped down from SICU . Post operative course complicated by ileus. Pt demonstrating signs of clinical improvement, currently tolerating CLD.     Neuro: Pain well controlled on current regimen.   Cardio:   - 5u pRBC + 4u FFP + 1u of plt +2u of Riastep  - s/p A-line   - Hct stable, F/U AM labs   Pulm: s/p eTT, saturating well on RA   GI: NPO, consider advancing to CLD as tolerated   :  - LR@75. Voiding spontaneously.   - Cr. 0.34. No evidence of an HA    Gyn: Lochia wnl. Continue with counts  ID: Afebrile without leukocytosis. s/p Zosyn ( -  )      Amyeo Afroz Jereen, PGY-3   33 year old  s/p  section with salpingectomy EBL 363cc, UOP 350cc . Post op patient hypotensive and tachycardic, + Fast, went to the OR found to have 1 L of blood in abdomen without a found source of the bleeding. Incision was extended to a ventral midline incision and an intra-operative consult with general surgery. MTP was called. Pt received 5u pRBC + 4u FFP + 1u of plt +2u of Riastep.  Abthera vac placed and patient went to CT scan for angiogram which was negative for a bleeding. Pt was transferred intubated to the SICU for monitoring. Patient now s/p RTOR  for exploration and closure. Patient stepped down from SICU . Post operative course complicated by ileus. Pt demonstrating signs of clinical improvement, currently tolerating CLD.     Neuro: Pain well controlled on current regimen.   Cardio:   - 5u pRBC + 4u FFP + 1u of plt +2u of Riastep  - s/p A-line   - Hct stable, F/U AM labs   Pulm: s/p eTT, saturating well on RA   GI: CLD   :  - LR@75. Voiding spontaneously.   - Cr. 0.34. No evidence of an HA    Gyn: Lochia wnl. Continue with counts  ID: Afebrile without leukocytosis. s/p Zosyn ( -  )      Amyeo Afroz Jereen, PGY-3

## 2023-11-19 NOTE — PROGRESS NOTE ADULT - SUBJECTIVE AND OBJECTIVE BOX
OB Postpartum Note:  Delivery    S:   Pt seen with the assistance of a mandarin  ID# [id number]    32yo POD#4 s/p rLTCS & BS and POD#2 s/p RTOR for closure. Pt currently with post operative ileus. NPO overnight -18 with trial of clear liquids.   Yesterday pt with 3 small bouts of emesis after a onset of coughing. Reports she has been drinking teas and glasses of water without vomiting. Nausea improved. Passing flatus but also burping frequent.   Pain better controlled, report pain due to gas, otherwise on IV pain regimen. Voiding spontaneously. Standing up to use commode with assistance.  Denies heavy vaginal bleeding, CP/SOB, headache, visual disturbances, epigastric pain.     O:   Vitals:  Vital Signs Last 24 Hrs  T(C): 36.7 (2023 05:00), Max: 36.9 (2023 17:52)  T(F): 98.1 (2023 05:00), Max: 98.4 (2023 17:52)  HR: 53 (2023 05:00) (53 - 67)  BP: 117/70 (2023 05:00) (117/66 - 130/61)  BP(mean): --  RR: 18 (2023 05:00) (17 - 19)  SpO2: 99% (2023 05:00) (96% - 100%)    Parameters below as of 2023 05:00  Patient On (Oxygen Delivery Method): room air      PE:  Pt alert and oriented x4, improved from days prior. Responsive to line of questioning   Diffuse edema, improved from days prior to yesterday. Vulvar swelling consistent with days prior. Non pitting.   Abdomen is soft, mildly distended. Appropriately tender to palpation. Not tense. No rebound or guarding.   Incision: Midline vertical and low transverse incision with staples. No surrounding signs of infections.        MEDICATIONS  (STANDING):  acetaminophen     Tablet .. 975 milliGRAM(s) Oral <User Schedule>  diphtheria/tetanus/pertussis (acellular) Vaccine (Adacel) 0.5 milliLiter(s) IntraMuscular once  ferrous    sulfate 325 milliGRAM(s) Oral daily  heparin   Injectable 5000 Unit(s) SubCutaneous every 12 hours  ibuprofen  Tablet. 600 milliGRAM(s) Oral every 6 hours  influenza   Vaccine 0.5 milliLiter(s) IntraMuscular once  lactated ringers Bolus 1000 milliLiter(s) IV Bolus once  lactated ringers. 1000 milliLiter(s) (75 mL/Hr) IV Continuous <Continuous>  ondansetron Injectable 4 milliGRAM(s) IV Push once  ondansetron Injectable 4 milliGRAM(s) IV Push every 6 hours  senna 2 Tablet(s) Oral at bedtime    MEDICATIONS  (PRN):  diphenhydrAMINE 25 milliGRAM(s) Oral every 6 hours PRN Pruritus  guaiFENesin Oral Liquid (Sugar-Free) 200 milliGRAM(s) Oral every 6 hours PRN Cough  lanolin Ointment 1 Application(s) Topical every 6 hours PRN Sore Nipples  magnesium hydroxide Suspension 30 milliLiter(s) Oral two times a day PRN Constipation  metoclopramide Injectable 10 milliGRAM(s) IV Push every 6 hours PRN Nausea  oxyCODONE    IR 5 milliGRAM(s) Oral every 3 hours PRN Moderate to Severe Pain (4-10)  oxyCODONE    IR 5 milliGRAM(s) Oral once PRN Moderate to Severe Pain (4-10)  simethicone 80 milliGRAM(s) Chew every 4 hours PRN Gas      Labs:  Blood type: A Positive  Rubella IgG: RPR: Negative                          8.7<L>   6.46 >-----------< 217    (  @ 06:10 )             25.8<L>                        9.2<L>   6.72 >-----------< 195    (  @ 06:17 )             27.4<L>                        9.2<L>   8.36 >-----------< 145<L>    (  @ 06:40 )             27.5<L>    23 @ 06:10      140  |  108<H>  |  9   ----------------------------<  82  3.5   |  18<L>  |  0.54    23 @ 06:17      139  |  107  |  10  ----------------------------<  71  3.6   |  19<L>  |  0.55    23 @ 06:40      139  |  106  |  8   ----------------------------<  82  4.0   |  20<L>  |  0.49<L>        Ca    8.1<L>      2023 06:10  Ca    8.0<L>      2023 06:17  Ca    7.9<L>      2023 06:40  Phos  3.9       Mg     1.90         TPro  4.9<L>  /  Alb  2.4<L>  /  TBili  0.3  /  DBili  x   /  AST  15  /  ALT  15  /  AlkPhos  72  23 @ 06:10  TPro  5.1<L>  /  Alb  2.3<L>  /  TBili  0.4  /  DBili  x   /  AST  17  /  ALT  14  /  AlkPhos  83  23 @ 06:17  TPro  4.8<L>  /  Alb  2.0<L>  /  TBili  0.4  /  DBili  x   /  AST  20  /  ALT  15  /  AlkPhos  98  23 @ 06:40

## 2023-11-20 LAB
BASOPHILS # BLD AUTO: 0.02 K/UL — SIGNIFICANT CHANGE UP (ref 0–0.2)
BASOPHILS # BLD AUTO: 0.02 K/UL — SIGNIFICANT CHANGE UP (ref 0–0.2)
BASOPHILS NFR BLD AUTO: 0.3 % — SIGNIFICANT CHANGE UP (ref 0–2)
BASOPHILS NFR BLD AUTO: 0.3 % — SIGNIFICANT CHANGE UP (ref 0–2)
EOSINOPHIL # BLD AUTO: 0.12 K/UL — SIGNIFICANT CHANGE UP (ref 0–0.5)
EOSINOPHIL # BLD AUTO: 0.12 K/UL — SIGNIFICANT CHANGE UP (ref 0–0.5)
EOSINOPHIL NFR BLD AUTO: 1.9 % — SIGNIFICANT CHANGE UP (ref 0–6)
EOSINOPHIL NFR BLD AUTO: 1.9 % — SIGNIFICANT CHANGE UP (ref 0–6)
HCT VFR BLD CALC: 27.5 % — LOW (ref 34.5–45)
HCT VFR BLD CALC: 27.5 % — LOW (ref 34.5–45)
HGB BLD-MCNC: 9.4 G/DL — LOW (ref 11.5–15.5)
HGB BLD-MCNC: 9.4 G/DL — LOW (ref 11.5–15.5)
IANC: 4.33 K/UL — SIGNIFICANT CHANGE UP (ref 1.8–7.4)
IANC: 4.33 K/UL — SIGNIFICANT CHANGE UP (ref 1.8–7.4)
IMM GRANULOCYTES NFR BLD AUTO: 1 % — HIGH (ref 0–0.9)
IMM GRANULOCYTES NFR BLD AUTO: 1 % — HIGH (ref 0–0.9)
LYMPHOCYTES # BLD AUTO: 1.07 K/UL — SIGNIFICANT CHANGE UP (ref 1–3.3)
LYMPHOCYTES # BLD AUTO: 1.07 K/UL — SIGNIFICANT CHANGE UP (ref 1–3.3)
LYMPHOCYTES # BLD AUTO: 17.2 % — SIGNIFICANT CHANGE UP (ref 13–44)
LYMPHOCYTES # BLD AUTO: 17.2 % — SIGNIFICANT CHANGE UP (ref 13–44)
MCHC RBC-ENTMCNC: 30.7 PG — SIGNIFICANT CHANGE UP (ref 27–34)
MCHC RBC-ENTMCNC: 30.7 PG — SIGNIFICANT CHANGE UP (ref 27–34)
MCHC RBC-ENTMCNC: 34.2 GM/DL — SIGNIFICANT CHANGE UP (ref 32–36)
MCHC RBC-ENTMCNC: 34.2 GM/DL — SIGNIFICANT CHANGE UP (ref 32–36)
MCV RBC AUTO: 89.9 FL — SIGNIFICANT CHANGE UP (ref 80–100)
MCV RBC AUTO: 89.9 FL — SIGNIFICANT CHANGE UP (ref 80–100)
MONOCYTES # BLD AUTO: 0.62 K/UL — SIGNIFICANT CHANGE UP (ref 0–0.9)
MONOCYTES # BLD AUTO: 0.62 K/UL — SIGNIFICANT CHANGE UP (ref 0–0.9)
MONOCYTES NFR BLD AUTO: 10 % — SIGNIFICANT CHANGE UP (ref 2–14)
MONOCYTES NFR BLD AUTO: 10 % — SIGNIFICANT CHANGE UP (ref 2–14)
NEUTROPHILS # BLD AUTO: 4.33 K/UL — SIGNIFICANT CHANGE UP (ref 1.8–7.4)
NEUTROPHILS # BLD AUTO: 4.33 K/UL — SIGNIFICANT CHANGE UP (ref 1.8–7.4)
NEUTROPHILS NFR BLD AUTO: 69.6 % — SIGNIFICANT CHANGE UP (ref 43–77)
NEUTROPHILS NFR BLD AUTO: 69.6 % — SIGNIFICANT CHANGE UP (ref 43–77)
NRBC # BLD: 0 /100 WBCS — SIGNIFICANT CHANGE UP (ref 0–0)
NRBC # BLD: 0 /100 WBCS — SIGNIFICANT CHANGE UP (ref 0–0)
NRBC # FLD: 0 K/UL — SIGNIFICANT CHANGE UP (ref 0–0)
NRBC # FLD: 0 K/UL — SIGNIFICANT CHANGE UP (ref 0–0)
PLATELET # BLD AUTO: 249 K/UL — SIGNIFICANT CHANGE UP (ref 150–400)
PLATELET # BLD AUTO: 249 K/UL — SIGNIFICANT CHANGE UP (ref 150–400)
RBC # BLD: 3.06 M/UL — LOW (ref 3.8–5.2)
RBC # BLD: 3.06 M/UL — LOW (ref 3.8–5.2)
RBC # FLD: 13.8 % — SIGNIFICANT CHANGE UP (ref 10.3–14.5)
RBC # FLD: 13.8 % — SIGNIFICANT CHANGE UP (ref 10.3–14.5)
WBC # BLD: 6.22 K/UL — SIGNIFICANT CHANGE UP (ref 3.8–10.5)
WBC # BLD: 6.22 K/UL — SIGNIFICANT CHANGE UP (ref 3.8–10.5)
WBC # FLD AUTO: 6.22 K/UL — SIGNIFICANT CHANGE UP (ref 3.8–10.5)
WBC # FLD AUTO: 6.22 K/UL — SIGNIFICANT CHANGE UP (ref 3.8–10.5)

## 2023-11-20 RX ADMIN — Medication 1 ENEMA: at 09:00

## 2023-11-20 RX ADMIN — Medication 975 MILLIGRAM(S): at 23:40

## 2023-11-20 RX ADMIN — Medication 200 MILLIGRAM(S): at 13:57

## 2023-11-20 RX ADMIN — Medication 975 MILLIGRAM(S): at 04:14

## 2023-11-20 RX ADMIN — Medication 600 MILLIGRAM(S): at 00:30

## 2023-11-20 RX ADMIN — Medication 200 MILLIGRAM(S): at 03:45

## 2023-11-20 RX ADMIN — Medication 975 MILLIGRAM(S): at 22:40

## 2023-11-20 RX ADMIN — Medication 600 MILLIGRAM(S): at 01:20

## 2023-11-20 RX ADMIN — HEPARIN SODIUM 5000 UNIT(S): 5000 INJECTION INTRAVENOUS; SUBCUTANEOUS at 03:42

## 2023-11-20 RX ADMIN — Medication 975 MILLIGRAM(S): at 03:42

## 2023-11-20 RX ADMIN — SENNA PLUS 2 TABLET(S): 8.6 TABLET ORAL at 22:43

## 2023-11-20 RX ADMIN — SENNA PLUS 2 TABLET(S): 8.6 TABLET ORAL at 00:30

## 2023-11-20 RX ADMIN — HEPARIN SODIUM 5000 UNIT(S): 5000 INJECTION INTRAVENOUS; SUBCUTANEOUS at 16:16

## 2023-11-20 NOTE — PROGRESS NOTE ADULT - ASSESSMENT
33 year old  s/p  section with salpingectomy EBL 363cc, UOP 350cc . Post op patient hypotensive and tachycardic, + Fast, went to the OR found to have 1 L of blood in abdomen without a found source of the bleeding. Incision was extended to a ventral midline incision and an intra-operative consult with general surgery. MTP was called. Pt received 5u pRBC + 4u FFP + 1u of plt +2u of Riastep.  Abthera vac placed and patient went to CT scan for angiogram which was negative for a bleeding. Pt was transferred intubated to the SICU for monitoring. Patient now s/p RTOR  for exploration and closure. Patient stepped down from SICU . Post operative course complicated by ileus. Pt demonstrating signs of clinical improvement, currently tolerating CLD.     Neuro: Pain well controlled on current regimen.   Cardio:   - 5u pRBC + 4u FFP + 1u of plt +2u of Riastep  - s/p A-line   - Hct stable, F/U AM labs   Pulm: s/p eTT, saturating well on RA   GI: FLD, consider advancing if tolerating lunch  :  - LR@75. Voiding spontaneously.   - Cr. 0.34. No evidence of an HA    Gyn: Lochia wnl. Continue with counts  ID: Afebrile without leukocytosis. s/p Zosyn ( -  )      Amyeo Afroz Jereen, PGY-3     33 year old  s/p  section with salpingectomy EBL 363cc, UOP 350cc . Post op patient hypotensive and tachycardic, + Fast, went to the OR found to have 1 L of blood in abdomen without a found source of the bleeding. Incision was extended to a ventral midline incision and an intra-operative consult with general surgery. MTP was called. Pt received 5u pRBC + 4u FFP + 1u of plt +2u of Riastep.  Abthera vac placed and patient went to CT scan for angiogram which was negative for a bleeding. Pt was transferred intubated to the SICU for monitoring. Patient now s/p RTOR  for exploration and closure. Patient stepped down from SICU . Post operative course complicated by ileus. Pt demonstrating signs of clinical improvement, currently tolerating CLD.     Neuro: Pain well controlled on current regimen.   Cardio:   - 5u pRBC + 4u FFP + 1u of plt +2u of Riastep  - s/p A-line   - Hct stable, F/U AM labs   Pulm: s/p eTT, saturating well on RA   GI: FLD, consider advancing if tolerating lunch  :  - SLIV. Voiding spontaneously.   - Cr. 0.34. No evidence of an HA    Gyn: Lochia wnl. Continue with counts  ID: Afebrile without leukocytosis. s/p Zosyn ( -  )      Amyeo Afroz Jereen, PGY-3

## 2023-11-20 NOTE — PROGRESS NOTE ADULT - SUBJECTIVE AND OBJECTIVE BOX
OB Postpartum Note:  Delivery    S: 34yo POD#5 s/p rLTCS & BS and POD#2 s/p RTOR for closure. Post-operative course complicated by ileus. NPO overnight - with trial of clear liquids.   Last episode of emesis after onset of coughing . Pt was advanced to FLD on , tolerated. Passing flatus. Complaining of constipation, improved after fleet enema this AM.   Pain better controlled, report pain due to gas, otherwise on IV pain regimen. Voiding spontaneously.   Standing up to use commode with assistance.  Denies heavy vaginal bleeding, CP/SOB, headache, visual disturbances, epigastric pain.       O:   Vitals:  Vital Signs Last 24 Hrs  T(C): 37 (2023 09:26), Max: 37 (2023 14:00)  T(F): 98.6 (2023 09:26), Max: 98.6 (2023 14:00)  HR: 62 (2023 09:26) (54 - 88)  BP: 115/82 (2023 09:26) (104/60 - 132/71)  BP(mean): --  RR: 18 (2023 09:26) (18 - 18)  SpO2: 99% (2023 09:26) (99% - 100%)    Parameters below as of 2023 06:12  Patient On (Oxygen Delivery Method): room air        MEDICATIONS  (STANDING):  acetaminophen     Tablet .. 975 milliGRAM(s) Oral <User Schedule>  diphtheria/tetanus/pertussis (acellular) Vaccine (Adacel) 0.5 milliLiter(s) IntraMuscular once  ferrous    sulfate 325 milliGRAM(s) Oral daily  heparin   Injectable 5000 Unit(s) SubCutaneous every 12 hours  ibuprofen  Tablet. 600 milliGRAM(s) Oral every 6 hours  influenza   Vaccine 0.5 milliLiter(s) IntraMuscular once  lactated ringers Bolus 1000 milliLiter(s) IV Bolus once  ondansetron Injectable 4 milliGRAM(s) IV Push every 6 hours  ondansetron Injectable 4 milliGRAM(s) IV Push once  senna 2 Tablet(s) Oral at bedtime    MEDICATIONS  (PRN):  diphenhydrAMINE 25 milliGRAM(s) Oral every 6 hours PRN Pruritus  guaiFENesin Oral Liquid (Sugar-Free) 200 milliGRAM(s) Oral every 6 hours PRN Cough  lanolin Ointment 1 Application(s) Topical every 6 hours PRN Sore Nipples  magnesium hydroxide Suspension 30 milliLiter(s) Oral two times a day PRN Constipation  metoclopramide Injectable 10 milliGRAM(s) IV Push every 6 hours PRN Nausea  oxyCODONE    IR 5 milliGRAM(s) Oral once PRN Moderate to Severe Pain (4-10)  oxyCODONE    IR 5 milliGRAM(s) Oral every 3 hours PRN Moderate to Severe Pain (4-10)  simethicone 80 milliGRAM(s) Chew every 4 hours PRN Gas      Labs:  Blood type: A Positive  Rubella IgG: RPR: Negative                          9.4<L>   6.22 >-----------< 249    (  @ 07:10 )             27.5<L>                        8.7<L>   6.46 >-----------< 217    (  @ 06:10 )             25.8<L>                        9.2<L>   6.72 >-----------< 195    (  @ 06:17 )             27.4<L>    23 @ 06:10      140  |  108<H>  |  9   ----------------------------<  82  3.5   |  18<L>  |  0.54    23 @ 06:17      139  |  107  |  10  ----------------------------<  71  3.6   |  19<L>  |  0.55        Ca    8.1<L>      2023 06:10  Ca    8.0<L>      2023 06:17    TPro  4.9<L>  /  Alb  2.4<L>  /  TBili  0.3  /  DBili  x   /  AST  15  /  ALT  15  /  AlkPhos  72  23 @ 06:10  TPro  5.1<L>  /  Alb  2.3<L>  /  TBili  0.4  /  DBili  x   /  AST  17  /  ALT  14  /  AlkPhos  83  23 @ 06:17          PE:  General: NAD, patient resting comfortably in bed  Abdomen: Mildly distended, appropriately tender. No rebound tenderness or guarding.   Incision: Clean, dry, intact.   Extremities: SCDs in place, no erythema.

## 2023-11-21 RX ADMIN — Medication 200 MILLIGRAM(S): at 11:45

## 2023-11-21 RX ADMIN — Medication 600 MILLIGRAM(S): at 05:07

## 2023-11-21 RX ADMIN — Medication 325 MILLIGRAM(S): at 11:22

## 2023-11-21 RX ADMIN — HEPARIN SODIUM 5000 UNIT(S): 5000 INJECTION INTRAVENOUS; SUBCUTANEOUS at 04:07

## 2023-11-21 RX ADMIN — Medication 600 MILLIGRAM(S): at 04:07

## 2023-11-21 RX ADMIN — HEPARIN SODIUM 5000 UNIT(S): 5000 INJECTION INTRAVENOUS; SUBCUTANEOUS at 15:49

## 2023-11-21 NOTE — PROGRESS NOTE ADULT - SUBJECTIVE AND OBJECTIVE BOX
34yo POD#5 s/p rLTCS & BS and POD7 s/p RTOR for closure. Post-operative course complicated by ileus. NPO overnight 11/17-18 with trial of clear liquids.   Last episode of emesis after onset of coughing 11/18. Pt was advanced to FLD on 11/19, tolerated. Advanced to Regular diet 11/20, tolerating. Passing flatus.  Complaining of constipation, improved after fleet enema x2. Pain better controlled, report pain due to gas, otherwise on PO pain regimen. Voiding spontaneously.  Standing up to use commode with assistance.  Denies heavy vaginal bleeding, CP/SOB, headache, visual disturbances, epigastric pain.       O:   Vitals:  Vital Signs Last 24 Hrs  T(C): 36.7 (21 Nov 2023 10:00), Max: 36.9 (20 Nov 2023 14:00)  T(F): 98 (21 Nov 2023 10:00), Max: 98.5 (20 Nov 2023 14:00)  HR: 59 (21 Nov 2023 10:00) (55 - 61)  BP: 128/72 (21 Nov 2023 10:00) (114/57 - 135/69)  BP(mean): --  RR: 17 (21 Nov 2023 10:00) (16 - 18)  SpO2: 99% (21 Nov 2023 10:00) (99% - 100%)    Parameters below as of 21 Nov 2023 10:00  Patient On (Oxygen Delivery Method): room air        MEDICATIONS  (STANDING):  acetaminophen     Tablet .. 975 milliGRAM(s) Oral <User Schedule>  diphtheria/tetanus/pertussis (acellular) Vaccine (Adacel) 0.5 milliLiter(s) IntraMuscular once  ferrous    sulfate 325 milliGRAM(s) Oral daily  heparin   Injectable 5000 Unit(s) SubCutaneous every 12 hours  ibuprofen  Tablet. 600 milliGRAM(s) Oral every 6 hours  influenza   Vaccine 0.5 milliLiter(s) IntraMuscular once  lactated ringers Bolus 1000 milliLiter(s) IV Bolus once  ondansetron Injectable 4 milliGRAM(s) IV Push every 6 hours  ondansetron Injectable 4 milliGRAM(s) IV Push once  saline laxative (FLEET) Rectal Enema 1 Enema Rectal once  senna 2 Tablet(s) Oral at bedtime    MEDICATIONS  (PRN):  diphenhydrAMINE 25 milliGRAM(s) Oral every 6 hours PRN Pruritus  guaiFENesin Oral Liquid (Sugar-Free) 200 milliGRAM(s) Oral every 6 hours PRN Cough  lanolin Ointment 1 Application(s) Topical every 6 hours PRN Sore Nipples  magnesium hydroxide Suspension 30 milliLiter(s) Oral two times a day PRN Constipation  metoclopramide Injectable 10 milliGRAM(s) IV Push every 6 hours PRN Nausea  oxyCODONE    IR 5 milliGRAM(s) Oral once PRN Moderate to Severe Pain (4-10)  oxyCODONE    IR 5 milliGRAM(s) Oral every 3 hours PRN Moderate to Severe Pain (4-10)  simethicone 80 milliGRAM(s) Chew every 4 hours PRN Gas      Labs:  Blood type: A Positive  Rubella IgG: RPR: Negative                          9.4<L>   6.22 >-----------< 249    ( 11-20 @ 07:10 )             27.5<L>                        8.7<L>   6.46 >-----------< 217    ( 11-19 @ 06:10 )             25.8<L>    11-19-23 @ 06:10      140  |  108<H>  |  9   ----------------------------<  82  3.5   |  18<L>  |  0.54        Ca    8.1<L>      19 Nov 2023 06:10    TPro  4.9<L>  /  Alb  2.4<L>  /  TBili  0.3  /  DBili  x   /  AST  15  /  ALT  15  /  AlkPhos  72  11-19-23 @ 06:10          PE:  General: NAD, patient resting comfortably in bed  Abdomen: Mildly distended, appropriately tender. No rebound tenderness or guarding.   Incision: Clean, dry, intact.   Extremities: SCDs in place, no erythema.

## 2023-11-21 NOTE — PROGRESS NOTE ADULT - ASSESSMENT
33 year old  s/p  section with salpingectomy EBL 363cc, UOP 350cc . Post op patient hypotensive and tachycardic, + Fast, went to the OR found to have 1 L of blood in abdomen without a found source of the bleeding. Incision was extended to a ventral midline incision and an intra-operative consult with general surgery. MTP was called. Pt received 5u pRBC + 4u FFP + 1u of plt +2u of Riastep.  Abthera vac placed and patient went to CT scan for angiogram which was negative for a bleeding. Pt was transferred intubated to the SICU for monitoring. Patient now s/p RTOR  for exploration and closure. Patient stepped down from SICU . Post operative course complicated by ileus. Pt demonstrating signs of clinical improvement, currently tolerating regular diet.     Neuro: Pain well controlled on current regimen.   Cardio:   - 5u pRBC + 4u FFP + 1u of plt +2u of Riastep  - s/p A-line   - Hct stable, F/U AM labs   Pulm: s/p eTT, saturating well on RA   GI: regular diet  :  - SLIV. Voiding spontaneously.   - Cr. 0.34. No evidence of an HA - resolved    Gyn: Lochia wnl. Continue with counts  ID: Afebrile without leukocytosis. s/p Zosyn ( -  )      For discharge    Amyeo Afroz Jereen, PGY-3   33 year old  s/p  section with salpingectomy EBL 363cc, UOP 350cc . Post op patient hypotensive and tachycardic, + Fast, went to the OR found to have 1 L of blood in abdomen without a found source of the bleeding. Incision was extended to a ventral midline incision and an intra-operative consult with general surgery. MTP was called. Pt received 5u pRBC + 4u FFP + 1u of plt +2u of Riastep.  Abthera vac placed and patient went to CT scan for angiogram which was negative for a bleeding. Pt was transferred intubated to the SICU for monitoring. Patient now s/p RTOR  for exploration and closure. Patient stepped down from SICU . Post operative course complicated by ileus. Pt demonstrating signs of clinical improvement, currently tolerating regular diet.     Neuro: Pain well controlled on current regimen.   Cardio:   - 5u pRBC + 4u FFP + 1u of plt +2u of Riastep  - s/p A-line   - Hct stable, F/U AM labs   Pulm: s/p eTT, saturating well on RA   GI: regular diet  :  - SLIV. Voiding spontaneously.   - Cr. 0.34. No evidence of an HA - resolved    Gyn: Lochia wnl. Continue with counts  ID: Afebrile without leukocytosis. s/p Zosyn ( -  )      Amyeo Afroz Jereen, PGY-3

## 2023-11-22 RX ORDER — ACETAMINOPHEN 500 MG
3 TABLET ORAL
Qty: 0 | Refills: 0 | DISCHARGE
Start: 2023-11-22

## 2023-11-22 RX ORDER — IBUPROFEN 200 MG
1 TABLET ORAL
Qty: 0 | Refills: 0 | DISCHARGE
Start: 2023-11-22

## 2023-11-22 RX ORDER — FOLIC ACID 0.8 MG
0 TABLET ORAL
Refills: 0 | DISCHARGE

## 2023-11-22 RX ADMIN — Medication 200 MILLIGRAM(S): at 21:48

## 2023-11-22 RX ADMIN — Medication 200 MILLIGRAM(S): at 04:29

## 2023-11-22 RX ADMIN — HEPARIN SODIUM 5000 UNIT(S): 5000 INJECTION INTRAVENOUS; SUBCUTANEOUS at 03:49

## 2023-11-22 NOTE — PROGRESS NOTE ADULT - ASSESSMENT
33 year old  s/p  section with salpingectomy EBL 363cc, UOP 350cc . Post op patient hypotensive and tachycardic, + Fast, went to the OR found to have 1 L of blood in abdomen without a found source of the bleeding. Incision was extended to a ventral midline incision and an intra-operative consult with general surgery. MTP was called. Pt received 5u pRBC + 4u FFP + 1u of plt +2u of Riastep.  Abthera vac placed and patient went to CT scan for angiogram which was negative for a bleeding. Pt was transferred intubated to the SICU for monitoring. Patient now s/p RTOR  for exploration and closure. Patient stepped down from SICU . Post operative course complicated by ileus. Pt demonstrating signs of clinical improvement, currently tolerating regular diet.     Neuro: Pain well controlled on current regimen.   Cardio:   - 5u pRBC + 4u FFP + 1u of plt +2u of Riastep  - s/p A-line   - Hct stable, F/U AM labs   Pulm: s/p eTT, saturating well on RA   GI: regular diet  :  - SLIV. Voiding spontaneously.   - Cr. 0.34. No evidence of an HA - resolved    Gyn: Lochia wnl. Continue with counts  ID: Afebrile without leukocytosis. s/p Zosyn ( -  )    Discharge planning    Amyeo Afroz Jereen, PGY-3

## 2023-11-22 NOTE — PROGRESS NOTE ADULT - SUBJECTIVE AND OBJECTIVE BOX
OB Postpartum Note:  Delivery    S: 32yo POD#8 s/p rLTCS & BS and subsequent RTOR for reexploration of hemoperitoneum and admission to SICU with abthera, POD#7 from 3rd RTOR for closure.   Post-operative course complicated by ileus. NPO overnight - with trial of clear liquids.   Last episode of emesis after onset of coughing . Pt was advanced to FLD on , tolerated. Advanced to Regular diet , tolerating. Passing flatus.  Complaining of constipation, improved after fleet enema x2. Pain better controlled, report pain due to gas, otherwise on PO pain regimen. Voiding spontaneously.  Standing up to use commode with assistance.  Denies heavy vaginal bleeding, CP/SOB, headache, visual disturbances, epigastric pain.     O:   Vitals:  Vital Signs Last 24 Hrs  T(C): 36.8 (2023 11:00), Max: 37 (2023 14:00)  T(F): 98.3 (2023 11:00), Max: 98.6 (2023 14:00)  HR: 86 (2023 11:00) (54 - 86)  BP: 114/75 (2023 11:00) (114/63 - 132/76)  BP(mean): --  RR: 18 (2023 11:00) (16 - 18)  SpO2: 98% (2023 11:00) (98% - 100%)    Parameters below as of 2023 11:00  Patient On (Oxygen Delivery Method): room air        MEDICATIONS  (STANDING):  acetaminophen     Tablet .. 975 milliGRAM(s) Oral <User Schedule>  diphtheria/tetanus/pertussis (acellular) Vaccine (Adacel) 0.5 milliLiter(s) IntraMuscular once  ferrous    sulfate 325 milliGRAM(s) Oral daily  heparin   Injectable 5000 Unit(s) SubCutaneous every 12 hours  ibuprofen  Tablet. 600 milliGRAM(s) Oral every 6 hours  influenza   Vaccine 0.5 milliLiter(s) IntraMuscular once  lactated ringers Bolus 1000 milliLiter(s) IV Bolus once  ondansetron Injectable 4 milliGRAM(s) IV Push every 6 hours  ondansetron Injectable 4 milliGRAM(s) IV Push once  saline laxative (FLEET) Rectal Enema 1 Enema Rectal once  senna 2 Tablet(s) Oral at bedtime    MEDICATIONS  (PRN):  diphenhydrAMINE 25 milliGRAM(s) Oral every 6 hours PRN Pruritus  guaiFENesin Oral Liquid (Sugar-Free) 200 milliGRAM(s) Oral every 6 hours PRN Cough  lanolin Ointment 1 Application(s) Topical every 6 hours PRN Sore Nipples  magnesium hydroxide Suspension 30 milliLiter(s) Oral two times a day PRN Constipation  metoclopramide Injectable 10 milliGRAM(s) IV Push every 6 hours PRN Nausea  oxyCODONE    IR 5 milliGRAM(s) Oral once PRN Moderate to Severe Pain (4-10)  oxyCODONE    IR 5 milliGRAM(s) Oral every 3 hours PRN Moderate to Severe Pain (4-10)  simethicone 80 milliGRAM(s) Chew every 4 hours PRN Gas      Labs:  Blood type: A Positive  Rubella IgG: RPR: Negative                          9.4<L>   6.22 >-----------< 249    ( 11-20 @ 07:10 )             27.5<L>                  PE:  General: NAD, patient resting comfortably in bed  Abdomen: Mildly distended, appropriately tender. No rebound tenderness or guarding.   Incision: Clean, dry, intact.   Extremities: SCDs in place, no erythema.

## 2023-11-23 VITALS
TEMPERATURE: 98 F | SYSTOLIC BLOOD PRESSURE: 115 MMHG | DIASTOLIC BLOOD PRESSURE: 77 MMHG | OXYGEN SATURATION: 99 % | HEART RATE: 81 BPM | RESPIRATION RATE: 18 BRPM

## 2023-11-23 RX ADMIN — Medication 200 MILLIGRAM(S): at 09:28

## 2023-11-23 NOTE — PROGRESS NOTE ADULT - SUBJECTIVE AND OBJECTIVE BOX
OB Postpartum Note:  Delivery    S: 34yo POD#9 s/p rLTCS & BS and subsequent RTOR for reexploration of hemoperitoneum and admission to SICU with lisaa, POD#8 from 3rd RTOR for closure.   Post-operative course complicated by ileus. NPO overnight - with trial of clear liquids.   Last episode of emesis after onset of coughing . Pt was advanced to FLD on , tolerated. Advanced to Regular diet , tolerating. Passing flatus.  Previously complained of constipation, improved after fleet enema x2. Pain better controlled, report pain due to gas, otherwise on PO pain regimen. Voiding spontaneously.  Standing up to use commode with assistance.  Denies heavy vaginal bleeding, CP/SOB, headache, visual disturbances, epigastric pain.  Patient feeding infant in bed at the time of exam with no reported complaints at this time or overnight.     O:   Vitals:  Vital Signs Last 24 Hrs  T(C): 36.3 (2023 05:49), Max: 37.1 (2023 14:05)  T(F): 97.4 (2023 05:49), Max: 98.7 (2023 14:05)  HR: 57 (2023 05:49) (56 - 86)  BP: 117/66 (2023 05:49) (114/75 - 123/-)  RR: 17 (2023 05:49) (16 - 18)  SpO2: 99% (2023 05:49) (97% - 100%)    Parameters below as of 2023 05:49  Patient On (Oxygen Delivery Method): room air            MEDICATIONS  (STANDING):  acetaminophen     Tablet .. 975 milliGRAM(s) Oral <User Schedule>  diphtheria/tetanus/pertussis (acellular) Vaccine (Adacel) 0.5 milliLiter(s) IntraMuscular once  ferrous    sulfate 325 milliGRAM(s) Oral daily  heparin   Injectable 5000 Unit(s) SubCutaneous every 12 hours  ibuprofen  Tablet. 600 milliGRAM(s) Oral every 6 hours  influenza   Vaccine 0.5 milliLiter(s) IntraMuscular once  lactated ringers Bolus 1000 milliLiter(s) IV Bolus once  ondansetron Injectable 4 milliGRAM(s) IV Push every 6 hours  ondansetron Injectable 4 milliGRAM(s) IV Push once  saline laxative (FLEET) Rectal Enema 1 Enema Rectal once  senna 2 Tablet(s) Oral at bedtime    MEDICATIONS  (PRN):  diphenhydrAMINE 25 milliGRAM(s) Oral every 6 hours PRN Pruritus  guaiFENesin Oral Liquid (Sugar-Free) 200 milliGRAM(s) Oral every 6 hours PRN Cough  lanolin Ointment 1 Application(s) Topical every 6 hours PRN Sore Nipples  magnesium hydroxide Suspension 30 milliLiter(s) Oral two times a day PRN Constipation  metoclopramide Injectable 10 milliGRAM(s) IV Push every 6 hours PRN Nausea  oxyCODONE    IR 5 milliGRAM(s) Oral once PRN Moderate to Severe Pain (4-10)  oxyCODONE    IR 5 milliGRAM(s) Oral every 3 hours PRN Moderate to Severe Pain (4-10)  simethicone 80 milliGRAM(s) Chew every 4 hours PRN Gas      Labs:  Blood type: A Positive  Rubella IgG: RPR: Negative                          9.4<L>   6.22 >-----------< 249    (  @ 07:10 )             27.5<L>                  PE:  General: NAD, patient resting comfortably in bed  Abdomen: Mildly distended, appropriately tender. No rebound tenderness or guarding.   Incision: Clean, dry, intact.   Extremities: SCDs in place, no erythema.

## 2023-11-23 NOTE — PROGRESS NOTE ADULT - ATTENDING COMMENTS
Patient examined,  at bedside, questions answered.  Discussed care with SICU and B team attendings.    ICU Vital Signs Last 24 Hrs  T(C): 37.4 (15 Nov 2023 08:00), Max: 37.6 (15 Nov 2023 04:00)  T(F): 99.4 (15 Nov 2023 08:00), Max: 99.6 (15 Nov 2023 04:00)  HR: 83 (15 Nov 2023 10:00) (53 - 112)  BP: 97/50 (15 Nov 2023 08:00) (55/37 - 119/91)  BP(mean): 65 (15 Nov 2023 08:00) (41 - 96)  ABP: 93/56 (15 Nov 2023 10:00) (93/56 - 175/95)  ABP(mean): 69 (15 Nov 2023 10:00) (66 - 121)  RR: 14 (15 Nov 2023 10:00) (13 - 22)  SpO2: 100% (15 Nov 2023 10:00) (99% - 100%)    O2 Parameters below as of 15 Nov 2023 08:00  Patient On (Oxygen Delivery Method): ventilator    O2 Concentration (%): 40    patients opens eyes and nods to verbal stim  CTAB  RRR  abd softly distended with vac in situ draining scant serous, appropriately tender  no peripheral edema                          8.4    8.42  )-----------( 111      ( 15 Nov 2023 06:00 )             23.4   11-15    139  |  105  |  5<L>  ----------------------------<  105<H>  4.2   |  23  |  0.38<L>    Ca    8.4      15 Nov 2023 00:37  Phos  4.6     11-15  Mg     2.10     11-15    ABG - ( 15 Nov 2023 00:37 )  pH, Arterial: 7.41  pH, Blood: x     /  pCO2: 36    /  pO2: 171   / HCO3: 23    / Base Excess: -1.5  /  SaO2: 99.1      MEDICATIONS  (STANDING):  chlorhexidine 0.12% Liquid 15 milliLiter(s) Oral Mucosa every 12 hours  fentaNYL   Infusion 0.5 MICROgram(s)/kG/Hr (3.72 mL/Hr) IV Continuous <Continuous>  heparin   Injectable 5000 Unit(s) SubCutaneous every 8 hours  influenza   Vaccine 0.5 milliLiter(s) IntraMuscular once  lactated ringers. 1000 milliLiter(s) (100 mL/Hr) IV Continuous <Continuous>  piperacillin/tazobactam IVPB.. 3.375 Gram(s) IV Intermittent every 8 hours  propofol Infusion 20.004 MICROgram(s)/kG/Min (8.93 mL/Hr) IV Continuous <Continuous>    Chief complaint:  postpartum hemorrhage    The active issues are:  1. ABThera in situ    Imp/plan: no evidence of ongoing hemorrhage, patient appears well resuscitated and ready for abd closure pending OR availability today versus tomorrow    [facility]:    urgent issues - spectra [id number]  nonurgent issues - [phone]  patient appointments or afterhours - [phone]
33-year-old female s/p  and b/l salpingectomy, post-operatively bleed, hypotension with acute respiratory insufficiency in SICU.  Arousable  Lung coarse bs  Heart RR  ABD Softly dist, A-VAC    PLAN:   Neurologic:   - Propofol and fentanyl  Respiratory:   - MVent 450/14/5/40%  Cardiovascular:   -MAP>65  Gastrointestinal/Nutrition:   - NPO  - Plan RTOR   Renal/Genitourinary:   - LR @ 100   Hematologic:   - SCDs-SQH  - serial CBC  Infectious Disease:   - Zosyn  Endocrine:   - fu glucose  SICU
Pt seen and evaluated at bedside  Agree with above  Pt feeling much better, reports pain is mild  Tolerating PO without nausea/vomiting  Voiding freely   Staples removed from [place]ann[place]iel, every 3rd staple removed from vertical midline, rest to be removed in office   Pt desires d/c tomorrow     jacy UNDERWOOD
Pt seen and evaluated at bedside with MFM team  Agree with above   Pt intubated but responses to verbal stimuli   UOP low this morning  Abthera with minimal output   Appreciate excellent SICU care  OB available for OR with general surgery     jacy UNDERWOOD
 # [id number]  Pt seen and evaluated at bedside  Agree with above   Pt tolerated CLD this morning  Passing flatus  Abdominal pain controlled   Increase ambulation    jacy UNDERWOOD
33-year-old female s/p  and b/l salpingectomy, post-operatively bleed, hypotension with acute respiratory insufficiency in SICU.  Arousable  Lung coarse BS  Heart RR  ABD Softly dist  PLAN:   Neurologic:   - wean Propofol   -wean fentanyl  Respiratory:   - CPAP/PSV  Cardiovascular:   -MAP>65  Gastrointestinal/Nutrition:   - NPO  Renal/Genitourinary:   - LR @ 100   Hematologic:   - SCDs-SQH  Infectious Disease:   - Zosyn  Endocrine:   - fu glucose  SICU
Mandarin  #[id number]  Pt feels well and desires to go home  Meeting milestones for d/c  Inc--staples remain intact in vertical skin incision.  no drainage; intact  Ext NT  Pt improved   d/c Home today
saw and examined patient  doing well  alert and feeling better  recommend clear liquids and d/c rodas
Pt seen and evaluated at bedside  Agree with above  Pt with vomiting mid day yesterday. [place] CLD, consider advancing later today  Once tolerating diet, will transition to PO pain medication  Increase ambulation  Continue inpatient care    jacy UNDERWOOD
patient seen and examined at bedside. agree with above assessment and plan

## 2023-11-23 NOTE — PROGRESS NOTE ADULT - ASSESSMENT
33 year old  s/p  section with salpingectomy EBL 363cc, UOP 350cc . Post op patient hypotensive and tachycardic, + Fast, went to the OR found to have 1 L of blood in abdomen without a found source of the bleeding. Incision was extended to a ventral midline incision and an intra-operative consult with general surgery. MTP was called. Pt received 5u pRBC + 4u FFP + 1u of plt +2u of Riastep.  Abthera vac placed and patient went to CT scan for angiogram which was negative for a bleeding. Pt was transferred intubated to the SICU for monitoring. Patient now s/p RTOR  for exploration and closure. Patient stepped down from SICU . Post operative course complicated by ileus. Pt demonstrating signs of clinical improvement, currently tolerating regular diet with no acute complaints.     Neuro: Pain well controlled on current regimen.   Cardio:   - 5u pRBC + 4u FFP + 1u of plt +2u of Riastep  - s/p A-line   - Hct stable, F/U AM labs   Pulm: s/p eTT, saturating well on RA   GI: regular diet  :  - SLIV. Voiding spontaneously.   - Cr. 0.34. No evidence of an HA - resolved    Gyn: Lochia wnl. Continue with counts  ID: Afebrile without leukocytosis. s/p Zosyn ( -  )    Discharge planning    Chanell Winn MD, PGY-3

## 2023-11-27 ENCOUNTER — APPOINTMENT (OUTPATIENT)
Dept: OBGYN | Facility: CLINIC | Age: 33
End: 2023-11-27

## 2023-11-27 ENCOUNTER — APPOINTMENT (OUTPATIENT)
Dept: OBGYN | Facility: CLINIC | Age: 33
End: 2023-11-27
Payer: COMMERCIAL

## 2023-11-27 VITALS
WEIGHT: 143 LBS | BODY MASS INDEX: 25.34 KG/M2 | HEIGHT: 63 IN | SYSTOLIC BLOOD PRESSURE: 97 MMHG | HEART RATE: 78 BPM | DIASTOLIC BLOOD PRESSURE: 67 MMHG

## 2023-11-27 PROBLEM — Z98.891 HISTORY OF UTERINE SCAR FROM PREVIOUS SURGERY: Chronic | Status: ACTIVE | Noted: 2023-11-09

## 2023-11-27 PROCEDURE — 99213 OFFICE O/P EST LOW 20 MIN: CPT

## 2023-11-27 RX ORDER — AMOXICILLIN AND CLAVULANATE POTASSIUM 500; 125 MG/1; MG/1
500-125 TABLET, FILM COATED ORAL 3 TIMES DAILY
Qty: 30 | Refills: 0 | Status: ACTIVE | COMMUNITY
Start: 2023-11-27 | End: 1900-01-01

## 2023-11-29 ENCOUNTER — APPOINTMENT (OUTPATIENT)
Dept: OBGYN | Facility: CLINIC | Age: 33
End: 2023-11-29
Payer: COMMERCIAL

## 2023-11-29 VITALS
HEART RATE: 87 BPM | WEIGHT: 139 LBS | DIASTOLIC BLOOD PRESSURE: 68 MMHG | HEIGHT: 63 IN | SYSTOLIC BLOOD PRESSURE: 97 MMHG | BODY MASS INDEX: 24.63 KG/M2

## 2023-11-29 DIAGNOSIS — T81.49XA INFECTION FOLLOWING A PROCEDURE, OTHER SURGICAL SITE, INITIAL ENCOUNTER: ICD-10-CM

## 2023-11-29 PROCEDURE — 99024 POSTOP FOLLOW-UP VISIT: CPT

## 2023-12-05 LAB
SURGICAL PATHOLOGY STUDY: SIGNIFICANT CHANGE UP
SURGICAL PATHOLOGY STUDY: SIGNIFICANT CHANGE UP

## 2023-12-06 ENCOUNTER — APPOINTMENT (OUTPATIENT)
Dept: OBGYN | Facility: CLINIC | Age: 33
End: 2023-12-06
Payer: COMMERCIAL

## 2023-12-06 VITALS
HEIGHT: 63 IN | DIASTOLIC BLOOD PRESSURE: 73 MMHG | HEART RATE: 88 BPM | WEIGHT: 138 LBS | SYSTOLIC BLOOD PRESSURE: 108 MMHG | BODY MASS INDEX: 24.45 KG/M2

## 2023-12-06 PROCEDURE — 99024 POSTOP FOLLOW-UP VISIT: CPT

## 2024-01-04 ENCOUNTER — APPOINTMENT (OUTPATIENT)
Dept: OBGYN | Facility: CLINIC | Age: 34
End: 2024-01-04
Payer: COMMERCIAL

## 2024-01-04 VITALS
WEIGHT: 140 LBS | DIASTOLIC BLOOD PRESSURE: 70 MMHG | BODY MASS INDEX: 24.8 KG/M2 | SYSTOLIC BLOOD PRESSURE: 114 MMHG | HEART RATE: 87 BPM | HEIGHT: 63 IN

## 2024-01-04 PROCEDURE — 0503F POSTPARTUM CARE VISIT: CPT

## 2024-01-07 NOTE — HISTORY OF PRESENT ILLNESS
[Delivery Date: ___] : on [unfilled] [Male] : Delivery History: baby boy [Boy] : baby is a boy [Infant's Name ___] : [unfilled] [___ Oz] : [unfilled] oz [Circumcised] : circumcised [Living at Home] : is currently living at home [Bottle Feeding] : bottle feeding [Intended Contraception] : Intended Contraception: [Tubal Ligation] : tubal ligation [___ Lbs] : [unfilled] lbs [Postpartum Follow Up] : postpartum follow up [Primary C/S] : delivered by  section [Clean/Dry/Intact] : clean, dry and intact [Back to Normal] : is back to normal in size [Normal] : the vagina was normal [Not Done] : Examination of breasts not done [Doing Well] : is doing well [No Sign of Infection] : is showing no signs of infection [Excellent Pain Control] : has excellent pain control [None] : None [Complications:___] : no complications [Repeat C/S] : delivered by  section (repeat) [Breastfeeding] : not currently nursing [Resumed Menses] : has not resumed her menses [Resumed [place]] : has not resumed intercourse [S/Sx PP Depression] : no signs/symptoms of postpartum depression [Erythema] : not erythematous [Cervix Sample Taken] : cervical sample not taken for a Pap smear [de-identified] : had emergency laparotomy 3-4 hr after c/s for bleeding from upper quadrants [de-identified] : Pt feels well and offers no complaints. She denies any bleeding. Advised pt to try [employer] to help with C/S scar healing. Of note, pt reports she is going to [place] in 2 weeks and coming back in 6 months.  [de-identified] : RTO in 6 months for annual GYN visit.

## 2024-01-07 NOTE — END OF VISIT
[FreeTextEntry4] : This note was written by Zulay Avendano on 01/04/2024 actively solely Nannette Flores M.D.   All medical record entries made by this scribe at my, Nannette Flores M.D. direction and personally dictated by me on 01/04/2024. I have personally reviewed the chart and agree that the record reflects my personal performance of the history, physical exam, assessment, and plan.

## 2024-02-13 NOTE — HISTORY OF PRESENT ILLNESS
[0/10] : no pain reported [Fever] : no fever [Chills] : no chills [Nausea] : no nausea [Vomiting] : no vomiting [Clean/Dry/Intact] : clean, dry and intact [Erythema] : not erythematous [Normal] : the vagina was normal [Doing Well] : is doing well [Excellent Pain Control] : has excellent pain control [No Sign of Infection] : is showing no signs of infection [None] : None [de-identified] : Pain improved and erythema improved from antibioitcs

## 2024-02-28 PROBLEM — T81.49XA WOUND CELLULITIS AFTER SURGERY: Status: ACTIVE | Noted: 2023-11-27

## 2024-02-28 NOTE — HISTORY OF PRESENT ILLNESS
[2/10] : the patient reports pain that is 2/10 in severity [Fever] : no fever [Chills] : no chills [Nausea] : no nausea [Vomiting] : no vomiting [Clean/Dry/Intact] : clean, dry and intact [Doing Well] : is doing well [Excellent Pain Control] : has excellent pain control [No Sign of Infection] : is showing no signs of infection [Staples Removed] : staples were removed [None] : None [No [place]] : to avoid sexual intercourse [No Tampons/Suppositories] : against using tampons or vaginal suppositories [Limited ADLs] : to participate in activities of daily living with limitations [No Work] : not to work [No Sports] : not to participate in sports [de-identified] : Patient presents for removal of rest of staples and to see improvement in superficial wound infection [de-identified] : erythema almost gone
